# Patient Record
Sex: FEMALE | Race: WHITE | NOT HISPANIC OR LATINO | Employment: FULL TIME | URBAN - METROPOLITAN AREA
[De-identification: names, ages, dates, MRNs, and addresses within clinical notes are randomized per-mention and may not be internally consistent; named-entity substitution may affect disease eponyms.]

---

## 2019-10-30 ENCOUNTER — OFFICE VISIT (OUTPATIENT)
Dept: URGENT CARE | Facility: CLINIC | Age: 59
End: 2019-10-30
Payer: COMMERCIAL

## 2019-10-30 VITALS
HEART RATE: 102 BPM | OXYGEN SATURATION: 98 % | TEMPERATURE: 98.9 F | RESPIRATION RATE: 20 BRPM | DIASTOLIC BLOOD PRESSURE: 80 MMHG | WEIGHT: 135 LBS | SYSTOLIC BLOOD PRESSURE: 135 MMHG

## 2019-10-30 DIAGNOSIS — Z20.818 PERTUSSIS EXPOSURE: Primary | ICD-10-CM

## 2019-10-30 PROCEDURE — 99203 OFFICE O/P NEW LOW 30 MIN: CPT | Performed by: FAMILY MEDICINE

## 2019-10-30 PROCEDURE — 87801 DETECT AGNT MULT DNA AMPLI: CPT | Performed by: FAMILY MEDICINE

## 2019-10-30 RX ORDER — IBUPROFEN 400 MG/1
400 TABLET ORAL EVERY 6 HOURS PRN
COMMUNITY
End: 2022-04-21

## 2019-10-30 NOTE — PROGRESS NOTES
[facility]        NAME: Olga Lawrence is a 64 y o  female  : 1960    MRN: [id number]  DATE: 2019  TIME: 1:56 PM    Assessment and Plan   Pertussis exposure [Z20 818]  1  Pertussis exposure  Bordetella pertussis / parapertussis PCR     Currently asymptomatic  If positive, will contact patient and prescribed azithromycin as this will reduce contagiousness  Patient Instructions     Follow up with PCP in 3-5 days  Proceed to  ER if symptoms worsen  Chief Complaint     Chief Complaint   Patient presents with    Headache     states her father was dx  with pertusis  her daughter is having a baby  now she has a headache  was exposed to pertusis and would like to be checked         History of Present Illness       21-year-old female presents today due to concern for exposure to Bordetella pertussis  Is up-to-date on her vaccinations  Her father was recently diagnosed with whooping cough and she was exposed him for about 45 minutes  Review of Systems   Review of Systems   Constitutional: Negative for chills and fever  HENT: Negative for congestion and rhinorrhea  Respiratory: Negative for cough  Cardiovascular: Negative for chest pain  Gastrointestinal: Negative for diarrhea  Neurological: Positive for headaches  Negative for dizziness           Current Medications       Current Outpatient Medications:     ibuprofen (MOTRIN) 400 mg tablet, Take 400 mg by mouth every 6 (six) hours as needed for mild pain, Disp: , Rfl:     metoprolol tartrate (LOPRESSOR) 25 mg tablet, Take 25 mg by mouth every 12 (twelve) hours, Disp: , Rfl:     Current Allergies     Allergies as of 10/30/2019 - Reviewed 10/30/2019   Allergen Reaction Noted    Penicillins Hives 10/30/2019            The following portions of the patient's history were reviewed and updated as appropriate: allergies, current medications, past family history, past medical history, past social history, past surgical history and problem list      History reviewed  No pertinent past medical history  History reviewed  No pertinent surgical history  History reviewed  No pertinent family history  Medications have been verified  Objective   /80   Pulse 102   Temp 98 9 °F (37 2 °C)   Resp 20   Wt 61 2 kg (135 lb)   SpO2 98%   Breastfeeding? No        Physical Exam     Physical Exam   Constitutional: She is oriented to person, place, and time  She appears well-developed and well-nourished  No distress  HENT:   Head: Normocephalic and atraumatic  Nose: Nose normal    Mouth/Throat: Oropharynx is clear and moist  No oropharyngeal exudate  Eyes: Conjunctivae are normal  Right eye exhibits no discharge  Left eye exhibits no discharge  Neck: No tracheal deviation present  Pulmonary/Chest: Effort normal    Neurological: She is alert and oriented to person, place, and time  No sensory deficit  Skin: Skin is warm  She is not diaphoretic  Psychiatric: She has a normal mood and affect  Her behavior is normal  Judgment and thought content normal    Nursing note and vitals reviewed

## 2019-10-31 LAB
B PARAPERT DNA SPEC QL NAA+PROBE: NOT DETECTED
B PERT DNA SPEC QL NAA+PROBE: NOT DETECTED

## 2021-03-25 ENCOUNTER — OFFICE VISIT (OUTPATIENT)
Dept: FAMILY MEDICINE CLINIC | Facility: CLINIC | Age: 61
End: 2021-03-25
Payer: COMMERCIAL

## 2021-03-25 VITALS
HEIGHT: 62 IN | BODY MASS INDEX: 27.23 KG/M2 | DIASTOLIC BLOOD PRESSURE: 76 MMHG | OXYGEN SATURATION: 97 % | TEMPERATURE: 98 F | SYSTOLIC BLOOD PRESSURE: 110 MMHG | RESPIRATION RATE: 16 BRPM | WEIGHT: 148 LBS | HEART RATE: 76 BPM

## 2021-03-25 DIAGNOSIS — R07.2 PRECORDIAL PAIN: ICD-10-CM

## 2021-03-25 DIAGNOSIS — R00.2 PALPITATIONS: Primary | ICD-10-CM

## 2021-03-25 DIAGNOSIS — K21.00 GASTROESOPHAGEAL REFLUX DISEASE WITH ESOPHAGITIS WITHOUT HEMORRHAGE: ICD-10-CM

## 2021-03-25 PROCEDURE — 1036F TOBACCO NON-USER: CPT | Performed by: INTERNAL MEDICINE

## 2021-03-25 PROCEDURE — 3725F SCREEN DEPRESSION PERFORMED: CPT | Performed by: INTERNAL MEDICINE

## 2021-03-25 PROCEDURE — 3008F BODY MASS INDEX DOCD: CPT | Performed by: INTERNAL MEDICINE

## 2021-03-25 PROCEDURE — 99214 OFFICE O/P EST MOD 30 MIN: CPT | Performed by: INTERNAL MEDICINE

## 2021-03-25 RX ORDER — FAMOTIDINE 20 MG/1
20 TABLET, FILM COATED ORAL 2 TIMES DAILY
Qty: 60 TABLET | Refills: 1 | Status: SHIPPED | OUTPATIENT
Start: 2021-03-25 | End: 2021-09-23 | Stop reason: SDUPTHER

## 2021-03-25 RX ORDER — METOPROLOL SUCCINATE 25 MG/1
50 TABLET, EXTENDED RELEASE ORAL 2 TIMES DAILY
COMMUNITY

## 2021-03-25 NOTE — PROGRESS NOTES
Assessment/Plan:   retrosternal chest pain coming on exertion or relieved by rest it is highly suggestive of coronary pain  She has had stress tests stress echoes which have been normal   My feeling is she may have some anatomical variant from birth which is causing coronary type pain  She would need CTA of heart or cardiac catheterization however she says she is allergic to shellfish since afraid to take dye  She was told to have a steroid prep for that but that is only we will know if she has any coronary anatomical issues   meanwhile advised to get complete blood work including lipid profile  2  She could also have gastroesophageal reflux disease however know why the pain should come on exertion on this she has hiatal hernia then exertion impression hiatal hernia up into her chest   She was therefore advised to have gastroenterology consult possible repeat endoscopy and Tifs operation if necessary  3 history of SVT due to recurrence is ablation     Problem List Items Addressed This Visit     None      Visit Diagnoses     Palpitations    -  Primary    Relevant Orders    CBC and differential    Comprehensive metabolic panel    TSH, 3rd generation with Free T4 reflex    Urinalysis Macroscopic    Precordial pain        Relevant Orders    CBC and differential    Comprehensive metabolic panel    TSH, 3rd generation with Free T4 reflex    Lipid Cascade With Reflex to Lipoprotein Particle Assessment by NMR (With Graph)    Gastroesophageal reflux disease with esophagitis without hemorrhage        Relevant Medications    famotidine (PEPCID) 20 mg tablet    Other Relevant Orders    Ambulatory referral to Gastroenterology            Subjective:      Patient ID: Manjinder Null is a 64 y o  female  Patrick Arreola is here for follow-up  She has history of SVT  Which was refractory  To conventional treatment  She she required to have electric cardioversion as well as ablation     She has been having recurrent  Retrosternal burning pain  She has had upper endoscopy done that showed reflux  This was done several years ago  Now pain is becoming a problem  Interestingly she has pain that comes on  exertion and is relieved by minute of rest   No nausea or shortness of breath or orthopnea or PND no peripheral edema  No nausea vomiting diarrhea  No dysphagia  No food intolerance except for highly acidic foods  Her father had coronary artery disease and angioplasty  There is no other major cardiac history in the family  The following portions of the patient's history were reviewed and updated as appropriate:   Past Medical History:  She has no past medical history on file ,  _______________________________________________________________________  Medical Problems:  does not have a problem list on file ,  _______________________________________________________________________  Past Surgical History:   has no past surgical history on file ,  _______________________________________________________________________  Family History:  family history is not on file ,  _______________________________________________________________________  Social History:   reports that she has quit smoking  Her smoking use included cigarettes  She has never used smokeless tobacco  She reports that she does not drink alcohol or use drugs  ,  _______________________________________________________________________  Allergies:  is allergic to penicillins     _______________________________________________________________________  Current Outpatient Medications   Medication Sig Dispense Refill    metoprolol succinate (TOPROL-XL) 25 mg 24 hr tablet Take 25 mg by mouth 2 (two) times a day      famotidine (PEPCID) 20 mg tablet Take 1 tablet (20 mg total) by mouth 2 (two) times a day 60 tablet 1    ibuprofen (MOTRIN) 400 mg tablet Take 400 mg by mouth every 6 (six) hours as needed for mild pain      metoprolol tartrate (LOPRESSOR) 25 mg tablet Take 25 mg by mouth every 12 (twelve) hours       No current facility-administered medications for this visit       _______________________________________________________________________  Review of Systems   All other systems reviewed and are negative  Objective:  Vitals:    03/25/21 1415   BP: 110/76   BP Location: Left arm   Patient Position: Sitting   Cuff Size: Standard   Pulse: 76   Resp: 16   Temp: 98 °F (36 7 °C)   TempSrc: Tympanic   SpO2: 97%   Weight: 67 1 kg (148 lb)   Height: 5' 2" (1 575 m)     Body mass index is 27 07 kg/m²  Physical Exam  Vitals signs and nursing note reviewed  Constitutional:       Appearance: Normal appearance  She is well-developed and normal weight  HENT:      Head: Normocephalic and atraumatic  Right Ear: External ear normal       Left Ear: External ear normal       Nose: Nose normal    Eyes:      General: No scleral icterus  Extraocular Movements: Extraocular movements intact  Conjunctiva/sclera: Conjunctivae normal       Pupils: Pupils are equal, round, and reactive to light  Neck:      Musculoskeletal: Full passive range of motion without pain, normal range of motion and neck supple  No edema  Thyroid: No thyroid mass or thyromegaly  Vascular: No carotid bruit or JVD  Trachea: Trachea normal    Cardiovascular:      Rate and Rhythm: Normal rate and regular rhythm  Pulses: Normal pulses  Heart sounds: Normal heart sounds  No murmur  Pulmonary:      Effort: Pulmonary effort is normal       Breath sounds: Normal breath sounds  Abdominal:      General: Abdomen is flat  Bowel sounds are normal  There is no distension  Palpations: Abdomen is soft  There is no mass  Hernia: No hernia is present  Musculoskeletal: Normal range of motion  Right lower leg: No edema  Left lower leg: No edema  Lymphadenopathy:      Cervical: No cervical adenopathy  Skin:     General: Skin is warm and dry     Neurological:      Mental Status: She is alert and oriented to person, place, and time  Cranial Nerves: No cranial nerve deficit  Sensory: No sensory deficit  Motor: No abnormal muscle tone  Coordination: Coordination normal       Deep Tendon Reflexes: Reflexes normal    Psychiatric:         Behavior: Behavior normal          Thought Content:  Thought content normal

## 2021-03-26 ENCOUNTER — TRANSCRIBE ORDERS (OUTPATIENT)
Dept: LAB | Facility: CLINIC | Age: 61
End: 2021-03-26

## 2021-03-26 ENCOUNTER — APPOINTMENT (OUTPATIENT)
Dept: LAB | Facility: CLINIC | Age: 61
End: 2021-03-26
Payer: COMMERCIAL

## 2021-03-26 DIAGNOSIS — R00.2 PALPITATIONS: Primary | ICD-10-CM

## 2021-03-26 DIAGNOSIS — R07.2 PRECORDIAL PAIN: ICD-10-CM

## 2021-03-26 LAB
ALBUMIN SERPL BCP-MCNC: 3.9 G/DL (ref 3.5–5)
ALP SERPL-CCNC: 48 U/L (ref 46–116)
ALT SERPL W P-5'-P-CCNC: 39 U/L (ref 12–78)
ANION GAP SERPL CALCULATED.3IONS-SCNC: 9 MMOL/L (ref 4–13)
AST SERPL W P-5'-P-CCNC: 23 U/L (ref 5–45)
BASOPHILS # BLD AUTO: 0.04 THOUSANDS/ΜL (ref 0–0.1)
BASOPHILS NFR BLD AUTO: 1 % (ref 0–1)
BILIRUB SERPL-MCNC: 0.41 MG/DL (ref 0.2–1)
BILIRUB UR QL STRIP: NEGATIVE
BUN SERPL-MCNC: 17 MG/DL (ref 5–25)
CALCIUM SERPL-MCNC: 9.3 MG/DL (ref 8.3–10.1)
CHLORIDE SERPL-SCNC: 105 MMOL/L (ref 100–108)
CLARITY UR: CLEAR
CO2 SERPL-SCNC: 28 MMOL/L (ref 21–32)
COLOR UR: YELLOW
CREAT SERPL-MCNC: 0.79 MG/DL (ref 0.6–1.3)
EOSINOPHIL # BLD AUTO: 0.09 THOUSAND/ΜL (ref 0–0.61)
EOSINOPHIL NFR BLD AUTO: 1 % (ref 0–6)
ERYTHROCYTE [DISTWIDTH] IN BLOOD BY AUTOMATED COUNT: 13 % (ref 11.6–15.1)
GFR SERPL CREATININE-BSD FRML MDRD: 83 ML/MIN/1.73SQ M
GLUCOSE P FAST SERPL-MCNC: 95 MG/DL (ref 65–99)
GLUCOSE UR STRIP-MCNC: NEGATIVE MG/DL
HCT VFR BLD AUTO: 42.4 % (ref 34.8–46.1)
HGB BLD-MCNC: 14.2 G/DL (ref 11.5–15.4)
HGB UR QL STRIP.AUTO: NEGATIVE
IMM GRANULOCYTES # BLD AUTO: 0.02 THOUSAND/UL (ref 0–0.2)
IMM GRANULOCYTES NFR BLD AUTO: 0 % (ref 0–2)
KETONES UR STRIP-MCNC: NEGATIVE MG/DL
LEUKOCYTE ESTERASE UR QL STRIP: NEGATIVE
LYMPHOCYTES # BLD AUTO: 1.92 THOUSANDS/ΜL (ref 0.6–4.47)
LYMPHOCYTES NFR BLD AUTO: 27 % (ref 14–44)
MCH RBC QN AUTO: 30.8 PG (ref 26.8–34.3)
MCHC RBC AUTO-ENTMCNC: 33.5 G/DL (ref 31.4–37.4)
MCV RBC AUTO: 92 FL (ref 82–98)
MONOCYTES # BLD AUTO: 0.44 THOUSAND/ΜL (ref 0.17–1.22)
MONOCYTES NFR BLD AUTO: 6 % (ref 4–12)
NEUTROPHILS # BLD AUTO: 4.61 THOUSANDS/ΜL (ref 1.85–7.62)
NEUTS SEG NFR BLD AUTO: 65 % (ref 43–75)
NITRITE UR QL STRIP: NEGATIVE
NRBC BLD AUTO-RTO: 0 /100 WBCS
PH UR STRIP.AUTO: 5.5 [PH]
PLATELET # BLD AUTO: 238 THOUSANDS/UL (ref 149–390)
PMV BLD AUTO: 11.2 FL (ref 8.9–12.7)
POTASSIUM SERPL-SCNC: 4.2 MMOL/L (ref 3.5–5.3)
PROT SERPL-MCNC: 7.8 G/DL (ref 6.4–8.2)
PROT UR STRIP-MCNC: NEGATIVE MG/DL
RBC # BLD AUTO: 4.61 MILLION/UL (ref 3.81–5.12)
SODIUM SERPL-SCNC: 142 MMOL/L (ref 136–145)
SP GR UR STRIP.AUTO: >=1.03 (ref 1–1.03)
TSH SERPL DL<=0.05 MIU/L-ACNC: 1.65 UIU/ML (ref 0.36–3.74)
UROBILINOGEN UR QL STRIP.AUTO: 0.2 E.U./DL
WBC # BLD AUTO: 7.12 THOUSAND/UL (ref 4.31–10.16)

## 2021-03-26 PROCEDURE — 85025 COMPLETE CBC W/AUTO DIFF WBC: CPT

## 2021-03-26 PROCEDURE — 84443 ASSAY THYROID STIM HORMONE: CPT

## 2021-03-26 PROCEDURE — 80061 LIPID PANEL: CPT

## 2021-03-26 PROCEDURE — 36415 COLL VENOUS BLD VENIPUNCTURE: CPT

## 2021-03-26 PROCEDURE — 80053 COMPREHEN METABOLIC PANEL: CPT

## 2021-03-26 PROCEDURE — 81003 URINALYSIS AUTO W/O SCOPE: CPT

## 2021-03-29 LAB — MISCELLANEOUS LAB TEST RESULT: NORMAL

## 2021-03-30 ENCOUNTER — TELEPHONE (OUTPATIENT)
Dept: FAMILY MEDICINE CLINIC | Facility: CLINIC | Age: 61
End: 2021-03-30

## 2021-03-30 DIAGNOSIS — E78.2 MIXED HYPERLIPIDEMIA: Primary | ICD-10-CM

## 2021-03-31 ENCOUNTER — APPOINTMENT (OUTPATIENT)
Dept: LAB | Facility: HOSPITAL | Age: 61
End: 2021-03-31
Payer: COMMERCIAL

## 2021-03-31 ENCOUNTER — TRANSCRIBE ORDERS (OUTPATIENT)
Dept: LAB | Facility: HOSPITAL | Age: 61
End: 2021-03-31

## 2021-03-31 ENCOUNTER — TELEPHONE (OUTPATIENT)
Dept: FAMILY MEDICINE CLINIC | Facility: CLINIC | Age: 61
End: 2021-03-31

## 2021-03-31 ENCOUNTER — TELEPHONE (OUTPATIENT)
Dept: GASTROENTEROLOGY | Facility: CLINIC | Age: 61
End: 2021-03-31

## 2021-03-31 DIAGNOSIS — E78.2 MIXED HYPERLIPIDEMIA: Primary | ICD-10-CM

## 2021-03-31 DIAGNOSIS — E78.2 MIXED HYPERLIPIDEMIA: ICD-10-CM

## 2021-03-31 LAB
CHOLEST SERPL-MCNC: 247 MG/DL
HDLC SERPL-MCNC: 78 MG/DL
LDLC SERPL CALC-MCNC: 152 MG/DL (ref 0–100)
NONHDLC SERPL-MCNC: 169 MG/DL
TRIGL SERPL-MCNC: 83.4 MG/DL

## 2021-03-31 PROCEDURE — 80061 LIPID PANEL: CPT

## 2021-03-31 PROCEDURE — 36415 COLL VENOUS BLD VENIPUNCTURE: CPT

## 2021-03-31 NOTE — TELEPHONE ENCOUNTER
Patient had left message asking for a call to schedule an appointment with Dr Radha Estrada  I returned call and had to leave message for her to call back  If she returns call, please schedule  Thank you!

## 2021-03-31 NOTE — TELEPHONE ENCOUNTER
Parul Dominguez from [address] called - Dr Domo Plascencia called pt last night and said the lab had not drawn the right panel  Pt said Dr Domo Plascencia said he wanted a Lipid Cascade done but order in the chart says Lipid Panel  Lab diomedes both to make sure  Per Dr Domo Plascencia do Lipid Panel - insurance doesn't cover [address]  Called and informed Parul Dominguez - she will send Lipid Panel only and inform pt

## 2021-04-08 LAB
Lab: NORMAL
Lab: NORMAL
MISCELLANEOUS LAB TEST RESULT: NORMAL

## 2021-05-10 ENCOUNTER — TELEPHONE (OUTPATIENT)
Dept: GASTROENTEROLOGY | Facility: CLINIC | Age: 61
End: 2021-05-10

## 2021-05-10 ENCOUNTER — OFFICE VISIT (OUTPATIENT)
Dept: GASTROENTEROLOGY | Facility: CLINIC | Age: 61
End: 2021-05-10
Payer: COMMERCIAL

## 2021-05-10 VITALS
SYSTOLIC BLOOD PRESSURE: 113 MMHG | HEART RATE: 63 BPM | DIASTOLIC BLOOD PRESSURE: 72 MMHG | BODY MASS INDEX: 25.76 KG/M2 | HEIGHT: 62 IN | WEIGHT: 140 LBS

## 2021-05-10 DIAGNOSIS — Z12.12 ENCOUNTER FOR COLORECTAL CANCER SCREENING: ICD-10-CM

## 2021-05-10 DIAGNOSIS — Z12.11 ENCOUNTER FOR COLORECTAL CANCER SCREENING: ICD-10-CM

## 2021-05-10 DIAGNOSIS — K21.9 GASTROESOPHAGEAL REFLUX DISEASE, UNSPECIFIED WHETHER ESOPHAGITIS PRESENT: Primary | ICD-10-CM

## 2021-05-10 PROCEDURE — 1036F TOBACCO NON-USER: CPT | Performed by: INTERNAL MEDICINE

## 2021-05-10 PROCEDURE — 99204 OFFICE O/P NEW MOD 45 MIN: CPT | Performed by: INTERNAL MEDICINE

## 2021-05-10 PROCEDURE — 3008F BODY MASS INDEX DOCD: CPT | Performed by: INTERNAL MEDICINE

## 2021-05-10 NOTE — PROGRESS NOTES
[facility] - Outpatient Consultation  Jv Chang 63 y o  female MRN: [id number]  Encounter: [id number]          ASSESSMENT AND PLAN:      1  Gastroesophageal reflux disease, unspecified whether esophagitis present   patient with history of GERD symptoms and patient will continue on Pepcid prior to exercising I explained to the patient that likely this is related to exercise and she recently had a stress echo which was negative for any findings  - EGD; Future    2  Encounter for colorectal cancer screening   patient is refusing colonoscopy due to prep and electrolyte abnormality and therefore we will order Cologuard testing and if this is positive, then she will need colonoscopy and this was explained to the patient       The patient was seen and examined with      ______________________________________________________________________    HPI:      This is a 82-year-old female who presents today for evaluation of reflux symptoms while exercising  Patient states this has been going on for 2 years but recently she was not exercising as much because she did have some herniated discs and then she recently started exercising again and symptoms have returned  She states that she had an EGD and colonoscopy about 25 years ago and at that time colonoscopy was reportedly normal and she does report that her maternal grandfather had colon cancer but she does not want colonoscopy due to prep causing electrolyte abnormality and causing arrhthymias  Patient states that the Pepcid has been helping but she states that if she works out on an empty stomach and she does not have the symptoms  She otherwise states that she has no difficulty swallowing or significant weight loss  REVIEW OF SYSTEMS:    CONSTITUTIONAL: Denies any fever, chills, rigors, and weight loss  HEENT: No earache or tinnitus  Denies hearing loss or visual disturbances  CARDIOVASCULAR: No chest pain or palpitations  RESPIRATORY: Denies any cough, hemoptysis, shortness of breath or dyspnea on exertion  GASTROINTESTINAL: As noted in the History of Present Illness  GENITOURINARY: No problems with urination  Denies any hematuria or dysuria  NEUROLOGIC: No dizziness or vertigo, denies headaches  MUSCULOSKELETAL: Denies any muscle or joint pain  SKIN: Denies skin rashes or itching  ENDOCRINE: Denies excessive thirst  Denies intolerance to heat or cold  PSYCHOSOCIAL: Denies depression or anxiety  Denies any recent memory loss  Historical Information   Past Medical History:   Diagnosis Date    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[facility]accelerated idioventricular rhythm) ([facility]     PVC (premature ventricular contraction)     SVT (supraventricular tachycardia) ([facility])      Past Surgical History:   Procedure Laterality Date    COLONOSCOPY      UPPER GASTROINTESTINAL ENDOSCOPY       Social History   Social History     Substance and Sexual Activity   Alcohol Use Never    Frequency: Never     Social History     Substance and Sexual Activity   Drug Use Never     Social History     Tobacco Use   Smoking Status Former Smoker    Types: Cigarettes   Smokeless Tobacco Never Used     Family History   Problem Relation Age of Onset    No Known Problems Mother     No Known Problems Father        Meds/Allergies       Current Outpatient Medications:     Cranberry 1000 MG CAPS    famotidine (PEPCID) 20 mg tablet    Magnesium 100 MG CAPS    metoprolol succinate (TOPROL-XL) 25 mg 24 hr tablet    ibuprofen (MOTRIN) 400 mg tablet    metoprolol tartrate (LOPRESSOR) 25 mg tablet    Allergies   Allergen Reactions    Penicillins Hives    Shellfish-Derived Products - Food Allergy Swelling           Objective     Blood pressure 113/72, pulse 63, height 5' 2" (1 575 m), weight 63 5 kg (140 lb), not currently breastfeeding  Body mass index is 25 61 kg/m²          PHYSICAL EXAM:      General Appearance:   Alert, cooperative, no distress   HEENT:   Normocephalic, atraumatic, anicteric      Neck:  Supple, symmetrical, trachea midline   Lungs:   Clear to auscultation bilaterally; no rales, rhonchi or wheezing; respirations unlabored    Heart[de-identified]   Regular rate and rhythm; no murmur, rub, or gallop  Abdomen:   Soft, non-tender, non-distended; normal bowel sounds; no masses, no organomegaly    Genitalia:   Deferred    Rectal:   Deferred    Extremities:  No cyanosis, clubbing or edema    Pulses:  2+ and symmetric    Skin:  No jaundice, rashes, or lesions    Lymph nodes:  No palpable cervical lymphadenopathy        Lab Results:   No visits with results within 1 Day(s) from this visit  Latest known visit with results is:   Appointment on 03/31/2021   Component Date Value    Cholesterol 03/31/2021 247*    Triglycerides 03/31/2021 83 4     HDL, Direct 03/31/2021 78     LDL Calculated 03/31/2021 152*    Non-HDL-Chol (CHOL-HDL) 03/31/2021 169     Miscellaneous Lab Test R* 03/31/2021 [facility] [place]     REFERRAL TEST NAME 03/31/2021 [facility] [place]     REFERRAL LAB 03/31/2021 [facility]          Radiology Results:   No results found

## 2021-05-11 NOTE — TELEPHONE ENCOUNTER
Called and spoke with patient  I informed her that I spoke with manager, Sánchez Pierre  We can't schedule at [facility] due to not having block time there  Informed of the 3 places I can schedule  She will think about it and call me back  Will f/u with her in a week if she doesn't return call to schedule

## 2021-05-18 NOTE — TELEPHONE ENCOUNTER
Left message for patient to call regarding if she thought about as to where she would like to schedule her EGD, either at [facility] or [facility]  Will call patient one more time in 2 weeks if she doesn't return call to schedule

## 2021-05-27 NOTE — TELEPHONE ENCOUNTER
Since I spoke with patient regarding this matter  Will wait for patient to call if she wants to schedule

## 2021-09-22 ENCOUNTER — TELEPHONE (OUTPATIENT)
Dept: FAMILY MEDICINE CLINIC | Facility: CLINIC | Age: 61
End: 2021-09-22

## 2021-09-22 NOTE — TELEPHONE ENCOUNTER
Patient had called to make appt with Dr Lawrence Mireles however when I tried to call back there was no answer, left message

## 2021-09-23 ENCOUNTER — OFFICE VISIT (OUTPATIENT)
Dept: FAMILY MEDICINE CLINIC | Facility: CLINIC | Age: 61
End: 2021-09-23
Payer: COMMERCIAL

## 2021-09-23 ENCOUNTER — APPOINTMENT (OUTPATIENT)
Dept: LAB | Facility: CLINIC | Age: 61
End: 2021-09-23
Payer: COMMERCIAL

## 2021-09-23 VITALS
HEIGHT: 62 IN | RESPIRATION RATE: 14 BRPM | BODY MASS INDEX: 25.95 KG/M2 | TEMPERATURE: 97.7 F | HEART RATE: 72 BPM | WEIGHT: 141 LBS | DIASTOLIC BLOOD PRESSURE: 70 MMHG | OXYGEN SATURATION: 98 % | SYSTOLIC BLOOD PRESSURE: 110 MMHG

## 2021-09-23 DIAGNOSIS — R00.0 TACHYARRHYTHMIA: Primary | ICD-10-CM

## 2021-09-23 DIAGNOSIS — K21.9 GASTROESOPHAGEAL REFLUX DISEASE WITHOUT ESOPHAGITIS: ICD-10-CM

## 2021-09-23 DIAGNOSIS — R00.0 TACHYARRHYTHMIA: ICD-10-CM

## 2021-09-23 DIAGNOSIS — I44.2 AIVR (ACCELERATED IDIOVENTRICULAR RHYTHM) (HCC): ICD-10-CM

## 2021-09-23 DIAGNOSIS — K21.00 GASTROESOPHAGEAL REFLUX DISEASE WITH ESOPHAGITIS WITHOUT HEMORRHAGE: ICD-10-CM

## 2021-09-23 LAB
ALBUMIN SERPL BCP-MCNC: 4.1 G/DL (ref 3.5–5)
ALP SERPL-CCNC: 51 U/L (ref 46–116)
ALT SERPL W P-5'-P-CCNC: 19 U/L (ref 12–78)
ANION GAP SERPL CALCULATED.3IONS-SCNC: 6 MMOL/L (ref 4–13)
AST SERPL W P-5'-P-CCNC: 14 U/L (ref 5–45)
BASOPHILS # BLD AUTO: 0.03 THOUSANDS/ΜL (ref 0–0.1)
BASOPHILS NFR BLD AUTO: 0 % (ref 0–1)
BILIRUB SERPL-MCNC: 0.36 MG/DL (ref 0.2–1)
BUN SERPL-MCNC: 11 MG/DL (ref 5–25)
CALCIUM SERPL-MCNC: 9.2 MG/DL (ref 8.3–10.1)
CHLORIDE SERPL-SCNC: 104 MMOL/L (ref 100–108)
CO2 SERPL-SCNC: 31 MMOL/L (ref 21–32)
CREAT SERPL-MCNC: 0.78 MG/DL (ref 0.6–1.3)
CRP SERPL QL: <3 MG/L
EOSINOPHIL # BLD AUTO: 0.07 THOUSAND/ΜL (ref 0–0.61)
EOSINOPHIL NFR BLD AUTO: 1 % (ref 0–6)
ERYTHROCYTE [DISTWIDTH] IN BLOOD BY AUTOMATED COUNT: 13.3 % (ref 11.6–15.1)
ERYTHROCYTE [SEDIMENTATION RATE] IN BLOOD: 13 MM/HOUR (ref 0–29)
GFR SERPL CREATININE-BSD FRML MDRD: 83 ML/MIN/1.73SQ M
GLUCOSE SERPL-MCNC: 84 MG/DL (ref 65–140)
HCT VFR BLD AUTO: 43.5 % (ref 34.8–46.1)
HGB BLD-MCNC: 13.8 G/DL (ref 11.5–15.4)
IMM GRANULOCYTES # BLD AUTO: 0.02 THOUSAND/UL (ref 0–0.2)
IMM GRANULOCYTES NFR BLD AUTO: 0 % (ref 0–2)
LYMPHOCYTES # BLD AUTO: 2.38 THOUSANDS/ΜL (ref 0.6–4.47)
LYMPHOCYTES NFR BLD AUTO: 29 % (ref 14–44)
MCH RBC QN AUTO: 29.6 PG (ref 26.8–34.3)
MCHC RBC AUTO-ENTMCNC: 31.7 G/DL (ref 31.4–37.4)
MCV RBC AUTO: 93 FL (ref 82–98)
MONOCYTES # BLD AUTO: 0.45 THOUSAND/ΜL (ref 0.17–1.22)
MONOCYTES NFR BLD AUTO: 5 % (ref 4–12)
NEUTROPHILS # BLD AUTO: 5.36 THOUSANDS/ΜL (ref 1.85–7.62)
NEUTS SEG NFR BLD AUTO: 65 % (ref 43–75)
NRBC BLD AUTO-RTO: 0 /100 WBCS
PLATELET # BLD AUTO: 262 THOUSANDS/UL (ref 149–390)
PMV BLD AUTO: 11.1 FL (ref 8.9–12.7)
POTASSIUM SERPL-SCNC: 4.1 MMOL/L (ref 3.5–5.3)
PROT SERPL-MCNC: 8.1 G/DL (ref 6.4–8.2)
RBC # BLD AUTO: 4.67 MILLION/UL (ref 3.81–5.12)
SODIUM SERPL-SCNC: 141 MMOL/L (ref 136–145)
TSH SERPL DL<=0.05 MIU/L-ACNC: 2.28 UIU/ML (ref 0.36–3.74)
WBC # BLD AUTO: 8.31 THOUSAND/UL (ref 4.31–10.16)

## 2021-09-23 PROCEDURE — 86140 C-REACTIVE PROTEIN: CPT

## 2021-09-23 PROCEDURE — 84443 ASSAY THYROID STIM HORMONE: CPT

## 2021-09-23 PROCEDURE — 85652 RBC SED RATE AUTOMATED: CPT

## 2021-09-23 PROCEDURE — 36415 COLL VENOUS BLD VENIPUNCTURE: CPT

## 2021-09-23 PROCEDURE — 85025 COMPLETE CBC W/AUTO DIFF WBC: CPT

## 2021-09-23 PROCEDURE — 80053 COMPREHEN METABOLIC PANEL: CPT

## 2021-09-23 PROCEDURE — 1036F TOBACCO NON-USER: CPT | Performed by: INTERNAL MEDICINE

## 2021-09-23 PROCEDURE — 99214 OFFICE O/P EST MOD 30 MIN: CPT | Performed by: INTERNAL MEDICINE

## 2021-09-23 RX ORDER — FAMOTIDINE 20 MG/1
20 TABLET, FILM COATED ORAL 2 TIMES DAILY
Qty: 180 TABLET | Refills: 1 | Status: SHIPPED | OUTPATIENT
Start: 2021-09-23 | End: 2022-02-22

## 2021-09-29 NOTE — PROGRESS NOTES
Assessment/Plan:  1  Palpitations, premature beats associated shortness of breath is concerning may be recurrence of her SVT  Or she may have some inflammation like pericarditis or myocarditis  We going to get an echocardiogram CBC, sed rate, CRP  If all comes normal she may need a Holter monitor and cardiology follow-up  2  History of SVT status post ablation  3  Gastroesophageal reflux disease  4  History of accelerated idioventricular rhythm   meds and labs reviewed advised to continue metoprolol pending results of the test         Problem List Items Addressed This Visit        Cardiovascular and Mediastinum    AIVR (accelerated idioventricular rhythm) ([facility] [address] )      Other Visit Diagnoses     Tachyarrhythmia    -  Primary    Relevant Orders    CBC and differential (Completed)    Sedimentation rate, automated (Completed)    C-reactive protein (Completed)    Comprehensive metabolic panel (Completed)    TSH, 3rd generation with Free T4 reflex (Completed)    Echo complete    Gastroesophageal reflux disease without esophagitis        Relevant Medications    famotidine (PEPCID) 20 mg tablet    Gastroesophageal reflux disease with esophagitis without hemorrhage        Relevant Medications    famotidine (PEPCID) 20 mg tablet            Subjective:      Patient ID: Cody Lopez is a 63 y o  female  Aydinchristina Rodriguezer is here because of palpitations and shortness of breath  She has history of SVT that required multiple cardioversions  eventually ablation,  Since ablations she has not had any SVTs however she is having now some skipped beats head palpitations associated with shortness of breath  She is concerned  She has some retrosternal burning but she has history of reflux  No chest pain on exertion orthopnea or PND  No lower extremity edema    Appetite is good weight is stable      The following portions of the patient's history were reviewed and updated as appropriate:   Past Medical History:  She has a past medical history of AIVR (accelerated idioventricular rhythm) ([facility] [address] ), PVC (premature ventricular contraction), and SVT (supraventricular tachycardia) ([facility] [address] )  ,  _______________________________________________________________________  Medical Problems:  does not have any pertinent problems on file ,  _______________________________________________________________________  Past Surgical History:   has a past surgical history that includes Colonoscopy and Upper gastrointestinal endoscopy  ,  _______________________________________________________________________  Family History:  family history includes No Known Problems in her father and mother ,  _______________________________________________________________________  Social History:   reports that she has quit smoking  Her smoking use included cigarettes  She has never used smokeless tobacco  She reports that she does not drink alcohol and does not use drugs  ,  _______________________________________________________________________  Allergies:  is allergic to penicillins and shellfish-derived products - food allergy     _______________________________________________________________________  Current Outpatient Medications   Medication Sig Dispense Refill    Cranberry 1000 MG CAPS Take 1 tablet by mouth daily      famotidine (PEPCID) 20 mg tablet Take 1 tablet (20 mg total) by mouth 2 (two) times a day 180 tablet 1    ibuprofen (MOTRIN) 400 mg tablet Take 400 mg by mouth every 6 (six) hours as needed for mild pain      Magnesium 100 MG CAPS Take 100 mg by mouth 2 (two) times a day      metoprolol succinate (TOPROL-XL) 25 mg 24 hr tablet Take 100 mg by mouth daily Taking 100mg daily      metoprolol tartrate (LOPRESSOR) 25 mg tablet Take 25 mg by mouth every 12 (twelve) hours       No current facility-administered medications for this visit      _______________________________________________________________________  Review of Systems   All other systems reviewed and are negative  Objective:  Vitals:    09/23/21 1330   BP: 110/70   BP Location: Left arm   Patient Position: Sitting   Cuff Size: Standard   Pulse: 72   Resp: 14   Temp: 97 7 °F (36 5 °C)   TempSrc: Temporal   SpO2: 98%   Weight: 64 kg (141 lb)   Height: 5' 2" (1 575 m)     Body mass index is 25 79 kg/m²  Physical Exam  Vitals and nursing note reviewed  Constitutional:       Appearance: Normal appearance  She is well-developed and normal weight  HENT:      Head: Normocephalic  Right Ear: External ear normal       Left Ear: External ear normal       Nose: Nose normal       Mouth/Throat:      Mouth: Mucous membranes are moist    Eyes:      General: No scleral icterus  Extraocular Movements: Extraocular movements intact  Conjunctiva/sclera: Conjunctivae normal       Pupils: Pupils are equal, round, and reactive to light  Neck:      Thyroid: No thyroid mass or thyromegaly  Vascular: No carotid bruit or JVD  Trachea: Trachea normal    Cardiovascular:      Rate and Rhythm: Normal rate and regular rhythm  Pulses: Normal pulses  Heart sounds: Normal heart sounds  No murmur heard  Pulmonary:      Effort: Pulmonary effort is normal       Breath sounds: Normal breath sounds  Abdominal:      General: Bowel sounds are normal  There is no distension  Palpations: Abdomen is soft  There is no mass  Tenderness: There is no abdominal tenderness  Hernia: No hernia is present  Musculoskeletal:         General: Normal range of motion  Cervical back: Full passive range of motion without pain, normal range of motion and neck supple  No edema  Right lower leg: No edema  Left lower leg: No edema  Lymphadenopathy:      Cervical: No cervical adenopathy  Skin:     General: Skin is warm and dry  Neurological:      General: No focal deficit present  Mental Status: She is alert and oriented to person, place, and time        Cranial Nerves: No cranial nerve deficit  Sensory: No sensory deficit  Motor: No abnormal muscle tone  Coordination: Coordination normal       Deep Tendon Reflexes: Reflexes normal    Psychiatric:         Mood and Affect: Mood normal          Behavior: Behavior normal          Thought Content:  Thought content normal

## 2021-09-30 ENCOUNTER — OFFICE VISIT (OUTPATIENT)
Dept: FAMILY MEDICINE CLINIC | Facility: CLINIC | Age: 61
End: 2021-09-30
Payer: COMMERCIAL

## 2021-09-30 VITALS
WEIGHT: 142 LBS | BODY MASS INDEX: 26.13 KG/M2 | HEIGHT: 62 IN | TEMPERATURE: 98 F | HEART RATE: 70 BPM | OXYGEN SATURATION: 97 % | DIASTOLIC BLOOD PRESSURE: 64 MMHG | RESPIRATION RATE: 16 BRPM | SYSTOLIC BLOOD PRESSURE: 108 MMHG

## 2021-09-30 DIAGNOSIS — I47.1 SUPRAVENTRICULAR TACHYCARDIA (HCC): Primary | ICD-10-CM

## 2021-09-30 PROCEDURE — 99213 OFFICE O/P EST LOW 20 MIN: CPT | Performed by: INTERNAL MEDICINE

## 2021-09-30 PROCEDURE — 3008F BODY MASS INDEX DOCD: CPT | Performed by: INTERNAL MEDICINE

## 2021-09-30 NOTE — PROGRESS NOTES
Assessment/Plan:  1  Persistent cardiac arrhythmia most likely supraventricular however we not feel were diseases as not been documented I highly recommended that she get a Holter monitor done  Meanwhile I agree with increasing her metoprolol dose but to take 25 mg 3 him times a day and check  Her blood pressure before taking metoprolol to make sure it is not too low  Also check her heart rate  She is in her she can do   she was reassured again there is nothing see this is wrong with her heart  Echocardiogram was normal and blood work was also normal         Problem List Items Addressed This Visit     None            Subjective:      Patient ID: Bg Gaffney is a 63 y o  female  Yosephia Moni is here for follow-up  She is still having cardiac arrhythmia most likely supraventricular as she has history of the same is status post ablation  Is upsetting her weight much she things that is seriously something wrong with her heart  She was reassured his echocardiogram is normal there is no myocarditis or cardiomyopathy or pericarditis  CRP and sed rate was also normal suggesting there is no inflammation of cardiac muscle  However she should have a Holter monitor done to see what occurred however Jana as she has as she has history of accelerated idioventricular rhythm as well as supraventricular arrhythmia  For supraventricular may she has had an ablation  She has no dizziness or lightheadedness, no chest pains or shortness of breath, no orthopnea or PND, no peripheral edema      The following portions of the patient's history were reviewed and updated as appropriate:   Past Medical History:  She has a past medical history of [facility] ), PVC (premature ventricular contraction), and SVT (supraventricular tachycardia) ([facility] )  ,  _______________________________________________________________________  Medical Problems:  does not have any pertinent problems on file ,  _______________________________________________________________________  Past Surgical History:   has a past surgical history that includes Colonoscopy and Upper gastrointestinal endoscopy  ,  _______________________________________________________________________  Family History:  family history includes No Known Problems in her father and mother ,  _______________________________________________________________________  Social History:   reports that she has quit smoking  Her smoking use included cigarettes  She has never used smokeless tobacco  She reports that she does not drink alcohol and does not use drugs  ,  _______________________________________________________________________  Allergies:  is allergic to penicillins and shellfish-derived products - food allergy     _______________________________________________________________________  Current Outpatient Medications   Medication Sig Dispense Refill    Cranberry 1000 MG CAPS Take 1 tablet by mouth daily      famotidine (PEPCID) 20 mg tablet Take 1 tablet (20 mg total) by mouth 2 (two) times a day 180 tablet 1    ibuprofen (MOTRIN) 400 mg tablet Take 400 mg by mouth every 6 (six) hours as needed for mild pain      Magnesium 100 MG CAPS Take 100 mg by mouth 2 (two) times a day      metoprolol succinate (TOPROL-XL) 25 mg 24 hr tablet Take 100 mg by mouth daily Taking 100mg daily      metoprolol tartrate (LOPRESSOR) 25 mg tablet Take 25 mg by mouth every 12 (twelve) hours       No current facility-administered medications for this visit      _______________________________________________________________________  Review of Systems   All other systems reviewed and are negative          Objective:  Vitals:    09/30/21 1417   BP: 108/64   BP Location: Left arm   Patient Position: Sitting   Cuff Size: Standard   Pulse: 70   Resp: 16   Temp: 98 °F (36 7 °C)   TempSrc: Temporal   SpO2: 97%   Weight: 64 4 kg (142 lb)   Height: 5' 2" (1 575 m)     Body mass index is 25 97 kg/m²  Physical Exam  Constitutional:       Appearance: She is well-developed  HENT:      Head: Normocephalic  Right Ear: External ear normal       Left Ear: External ear normal       Nose: Nose normal    Eyes:      General: No scleral icterus  Conjunctiva/sclera: Conjunctivae normal       Pupils: Pupils are equal, round, and reactive to light  Neck:      Thyroid: No thyroid mass or thyromegaly  Vascular: No carotid bruit or JVD  Trachea: Trachea normal    Cardiovascular:      Rate and Rhythm: Normal rate and regular rhythm  Heart sounds: Normal heart sounds  No murmur heard  Pulmonary:      Effort: Pulmonary effort is normal       Breath sounds: Normal breath sounds  Abdominal:      General: Bowel sounds are normal  There is no distension  Palpations: Abdomen is soft  There is no mass  Hernia: No hernia is present  Musculoskeletal:         General: Normal range of motion  Cervical back: Full passive range of motion without pain and normal range of motion  No edema  Skin:     General: Skin is warm  Neurological:      Mental Status: She is alert and oriented to person, place, and time  Cranial Nerves: No cranial nerve deficit  Sensory: No sensory deficit  Motor: No abnormal muscle tone  Coordination: Coordination normal       Deep Tendon Reflexes: Reflexes normal    Psychiatric:         Behavior: Behavior normal          Thought Content:  Thought content normal

## 2021-11-23 DIAGNOSIS — Z11.52 ENCOUNTER FOR SCREENING FOR COVID-19: Primary | ICD-10-CM

## 2021-11-23 PROCEDURE — U0003 INFECTIOUS AGENT DETECTION BY NUCLEIC ACID (DNA OR RNA); SEVERE ACUTE RESPIRATORY SYNDROME CORONAVIRUS 2 (SARS-COV-2) (CORONAVIRUS DISEASE [COVID-19]), AMPLIFIED PROBE TECHNIQUE, MAKING USE OF HIGH THROUGHPUT TECHNOLOGIES AS DESCRIBED BY CMS-2020-01-R: HCPCS | Performed by: INTERNAL MEDICINE

## 2021-11-23 PROCEDURE — U0005 INFEC AGEN DETEC AMPLI PROBE: HCPCS | Performed by: INTERNAL MEDICINE

## 2022-02-03 ENCOUNTER — OFFICE VISIT (OUTPATIENT)
Dept: FAMILY MEDICINE CLINIC | Facility: CLINIC | Age: 62
End: 2022-02-03
Payer: COMMERCIAL

## 2022-02-03 VITALS
DIASTOLIC BLOOD PRESSURE: 80 MMHG | RESPIRATION RATE: 16 BRPM | OXYGEN SATURATION: 97 % | TEMPERATURE: 97.6 F | HEIGHT: 62 IN | HEART RATE: 64 BPM | SYSTOLIC BLOOD PRESSURE: 130 MMHG | BODY MASS INDEX: 26.35 KG/M2 | WEIGHT: 143.2 LBS

## 2022-02-03 DIAGNOSIS — R07.89 CHEST WALL PAIN: Primary | ICD-10-CM

## 2022-02-03 PROCEDURE — 99213 OFFICE O/P EST LOW 20 MIN: CPT | Performed by: INTERNAL MEDICINE

## 2022-02-03 NOTE — PROGRESS NOTES
Assessment/Plan:    No problem-specific Assessment & Plan notes found for this encounter  {Assess/PlanSmartLinks:82547}      Subjective:      Patient ID: Manjinder Negron is a 63 y o  female with past medical history of SVT s/p ablation here for follow up    HPI    {Common ambulatory SmartLinks:85004}    Review of Systems      Objective:      /80 (BP Location: Left arm, Patient Position: Sitting, Cuff Size: Standard)   Pulse 64   Temp 97 6 °F (36 4 °C) (Temporal)   Resp 16   Ht 5' 2" (1 575 m)   Wt 65 kg (143 lb 3 2 oz)   SpO2 97%   BMI 26 19 kg/m²          Physical Exam

## 2022-02-05 NOTE — PROGRESS NOTES
Assessment/Plan:  Musculoskeletal pain involving upper back  She was reassured it is nothing serious, should resolve slowly  She says pain is getting somewhat better than it was in the beginning  Not sure if there is any connection to COVID  History of SVT no recent recurrence         Problem List Items Addressed This Visit     None            Subjective:      Patient ID: Jacques Mooney is a 63 y o  female  Wendi Jenkins is here because of upper back pain that radiates around to the chest   Pain is sharp  Not pleuritic not associated with shortness of Breath  However pain is increased by body movement raising arm are turning around  Pain started shortly after she got COVID immunization  She has no rash, no fever, no chills  No headaches or dizziness, no generalized muscle aches bone aches headaches dizziness, no earache, no sore throat or difficulty swallowing, no swollen glands, no loss of smell or taste, no GI or  issues particularly no abdominal pain no heartburn no nausea vomiting diarrhea no dysuria or frequency      The following portions of the patient's history were reviewed and updated as appropriate:   Past Medical History:  She has a past medical history of AIVR (accelerated idioventricular rhythm) ([facility] ), PVC (premature ventricular contraction), and SVT (supraventricular tachycardia) ([facility] )  ,  _______________________________________________________________________  Medical Problems:  does not have any pertinent problems on file ,  _______________________________________________________________________  Past Surgical History:   has a past surgical history that includes Colonoscopy and Upper gastrointestinal endoscopy  ,  _______________________________________________________________________  Family History:  family history includes No Known Problems in her father and mother ,  _______________________________________________________________________  Social History:   reports that she has quit smoking  Her smoking use included cigarettes  She has never used smokeless tobacco  She reports that she does not drink alcohol and does not use drugs  ,  _______________________________________________________________________  Allergies:  is allergic to penicillins and shellfish-derived products - food allergy     _______________________________________________________________________  Current Outpatient Medications   Medication Sig Dispense Refill    Cranberry 1000 MG CAPS Take 1 tablet by mouth daily      famotidine (PEPCID) 20 mg tablet Take 1 tablet (20 mg total) by mouth 2 (two) times a day 180 tablet 1    Magnesium 100 MG CAPS Take 100 mg by mouth 2 (two) times a day      metoprolol succinate (TOPROL-XL) 25 mg 24 hr tablet Take 50 mg by mouth 2 (two) times a day Taking 100mg daily       ibuprofen (MOTRIN) 400 mg tablet Take 400 mg by mouth every 6 (six) hours as needed for mild pain (Patient not taking: Reported on 2/3/2022 )      metoprolol tartrate (LOPRESSOR) 25 mg tablet Take 25 mg by mouth every 12 (twelve) hours (Patient not taking: Reported on 2/3/2022 )       No current facility-administered medications for this visit      _______________________________________________________________________  Review of Systems   All other systems reviewed and are negative  Objective:  Vitals:    02/03/22 0957   BP: 130/80   BP Location: Left arm   Patient Position: Sitting   Cuff Size: Standard   Pulse: 64   Resp: 16   Temp: 97 6 °F (36 4 °C)   TempSrc: Temporal   SpO2: 97%   Weight: 65 kg (143 lb 3 2 oz)   Height: 5' 2" (1 575 m)     Body mass index is 26 19 kg/m²  Physical Exam  Constitutional:       Appearance: Normal appearance  She is well-developed  HENT:      Head: Normocephalic  Right Ear: External ear normal       Left Ear: External ear normal       Nose: Nose normal       Mouth/Throat:      Mouth: Mucous membranes are moist    Eyes:      General: No scleral icterus       Extraocular Movements: Extraocular movements intact  Conjunctiva/sclera: Conjunctivae normal       Pupils: Pupils are equal, round, and reactive to light  Neck:      Thyroid: No thyroid mass or thyromegaly  Vascular: No carotid bruit or JVD  Trachea: Trachea normal    Cardiovascular:      Rate and Rhythm: Normal rate and regular rhythm  Heart sounds: Normal heart sounds  No murmur heard  Pulmonary:      Effort: Pulmonary effort is normal       Breath sounds: Normal breath sounds  Abdominal:      General: Bowel sounds are normal  There is no distension  Palpations: Abdomen is soft  There is no mass  Hernia: No hernia is present  Musculoskeletal:         General: Normal range of motion  Cervical back: Full passive range of motion without pain, normal range of motion and neck supple  No edema  Comments: Pain upper back mid scapular region, has tenderness paraspinous region on the left  No rash to suggest herpes zoster  Lungs are clear   Lymphadenopathy:      Cervical: No cervical adenopathy  Skin:     General: Skin is warm  Neurological:      General: No focal deficit present  Mental Status: She is alert and oriented to person, place, and time  Cranial Nerves: No cranial nerve deficit  Sensory: No sensory deficit  Motor: No abnormal muscle tone  Coordination: Coordination normal       Deep Tendon Reflexes: Reflexes normal    Psychiatric:         Behavior: Behavior normal          Thought Content:  Thought content normal

## 2022-02-22 ENCOUNTER — OFFICE VISIT (OUTPATIENT)
Dept: DERMATOLOGY | Facility: CLINIC | Age: 62
End: 2022-02-22
Payer: COMMERCIAL

## 2022-02-22 VITALS — HEIGHT: 62 IN | BODY MASS INDEX: 26.13 KG/M2 | TEMPERATURE: 97.9 F | WEIGHT: 142 LBS

## 2022-02-22 DIAGNOSIS — L82.1 SEBORRHEIC KERATOSIS: Primary | ICD-10-CM

## 2022-02-22 PROCEDURE — 99203 OFFICE O/P NEW LOW 30 MIN: CPT | Performed by: DERMATOLOGY

## 2022-02-22 PROCEDURE — 3008F BODY MASS INDEX DOCD: CPT | Performed by: DERMATOLOGY

## 2022-02-22 PROCEDURE — 1036F TOBACCO NON-USER: CPT | Performed by: DERMATOLOGY

## 2022-02-22 NOTE — PATIENT INSTRUCTIONS
1  SEBORRHEIC KERATOSIS; NON-INFLAMED      Assessment and Plan:  Based on a thorough discussion of this condition and the management approach to it (including a comprehensive discussion of the known risks, side effects and potential benefits of treatment), the patient (family) agrees to implement the following specific plan:   Reassured benign    Continue to monitor for any changes    Continue annual skin checks     Seborrheic Keratosis  A seborrheic keratosis is a harmless warty spot that appears during adult life as a common sign of skin aging  Seborrheic keratoses can arise on any area of skin, covered or uncovered, with the usual exception of the palms and soles  They do not arise from mucous membranes  Seborrheic keratoses can have highly variable appearance  Seborrheic keratoses are extremely common  It has been estimated that over 90% of adults over the age of 61 years have one or more of them  They occur in males and females of all races, typically beginning to erupt in the 35s or 45s  They are uncommon under the age of 21 years  The precise cause of seborrhoeic keratoses is not known  Seborrhoeic keratoses are considered degenerative in nature  As time goes by, seborrheic keratoses tend to become more numerous  Some people inherit a tendency to develop a very large number of them; some people may have hundreds of them  The name "seborrheic keratosis" is misleading, because these lesions are not limited to a seborrhoeic distribution (scalp, mid-face, chest, upper back), nor are they formed from sebaceous glands, nor are they associated with sebum -- which is greasy    Seborrheic keratosis may also be called "SK," "Seb K," "basal cell papilloma," "senile wart," or "barnacle "      Researchers have noted:   Eruptive seborrhoeic keratoses can follow sunburn or dermatitis   Skin friction may be the reason they appear in body folds   Viral cause (e g , human papillomavirus) seems unlikely   Stable and clonal mutations or activation of FRFR3, PIK3CA, EPHRAIM, AKT1 and EGFR genes are found in seborrhoeic keratoses   Seborrhoeic keratosis can arise from solar lentigo   FRFR3 mutations also arise in solar lentigines  These mutations are associated with increased age and location on the head and neck, suggesting a role of ultraviolet radiation in these lesions   Seborrheic keratoses do not harbour tumour suppressor gene mutations   Epidermal growth factor receptor inhibitors, which are used to treat some cancers, often result in an increase in verrucal (warty) keratoses  There is no easy way to remove multiple lesions on a single occasion  Unless a specific lesion is "inflamed" and is causing pain or stinging/burning or is bleeding, most insurance companies do not authorize treatment

## 2022-02-22 NOTE — PROGRESS NOTES
[facility] Note     Patient Name: Ana Wolfe  Encounter Date: 02/22/2022     Have you been cared for by a [facility] Dermatologist in the last 3 years and, if so, which one? No    · Have you traveled outside of the [address] [place] [address] in the past 3 months or outside of the [facility] area in the last 2 weeks? No     May we call your Preferred Phone number to discuss your specific medical information? Yes     May we leave a detailed message that includes your specific medical information? Yes      Today's Chief Concerns:   Concern #1:  Spot of concern on back       Past Medical History:  Have you personally ever had or currently have any of the following? · Skin cancer (such as Melanoma, Basal Cell Carcinoma, Squamous Cell Carcinoma? (If Yes, please provide more detail)- No  · Eczema: No  · Psoriasis: No  · HIV/AIDS: No  · Hepatitis B or C: No  · Tuberculosis: No  · Systemic Immunosuppression such as Diabetes, Biologic or Immunotherapy, Chemotherapy, Organ Transplantation, Bone Marrow Transplantation (If YES, please provide more detail): No  · Radiation Treatment (If YES, please provide more detail): No  · Any other major medical conditions/concerns? 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 Photography     Family History:  Have any of your "first degree relatives" (parent, brother, sister, or child) had any of the following       · Skin cancer such as Melanoma or Merkel Cell Carcinoma or Pancreatic Cancer? No  · Eczema, Asthma, Hay Fever or Seasonal Allergies: No  · Psoriasis or Psoriatic Arthritis: No  · Do any other medical conditions seem to run in your family? If Yes, what condition and which relatives?   No    Current Medications:         Current Outpatient Medications:     Cranberry 1000 MG CAPS, Take 1 tablet by mouth daily, Disp: , Rfl:     famotidine (PEPCID) 20 mg tablet, Take 1 tablet (20 mg total) by mouth 2 (two) times a day, Disp: 180 tablet, Rfl: 1    Magnesium 100 MG CAPS, Take 100 mg by mouth 2 (two) times a day, Disp: , Rfl:     metoprolol succinate (TOPROL-XL) 25 mg 24 hr tablet, Take 50 mg by mouth 2 (two) times a day Taking 100mg daily , Disp: , Rfl:     ibuprofen (MOTRIN) 400 mg tablet, Take 400 mg by mouth every 6 (six) hours as needed for mild pain (Patient not taking: Reported on 2/3/2022 ), Disp: , Rfl:     metoprolol tartrate (LOPRESSOR) 25 mg tablet, Take 25 mg by mouth every 12 (twelve) hours (Patient not taking: Reported on 2/3/2022 ), Disp: , Rfl:       Review of Systems:  Have you recently had or currently have any of the following? If YES, what are you doing for the problem? · Fever, chills or unintended weight loss: No  · Sudden loss or change in your vision: No  · Nausea, vomiting or blood in your stool: No  · Painful or swollen joints: No  · Wheezing or cough: No  · Changing mole or non-healing wound: No  · Nosebleeds: No  · Excessive sweating: No  · Easy or prolonged bleeding? No  · Over the last 2 weeks, how often have you been bothered by the following problems? · Taking little interest or pleasure in doing things: 1 - Not at All  · Feeling down, depressed, or hopeless: 1 - Not at All  · Rapid heartbeat with epinephrine:  No    · FEMALES ONLY:    · Are you pregnant or planning to become pregnant? No  · Are you currently or planning to be nursing or breast feeding? No    · Any known allergies? Allergies   Allergen Reactions    Penicillins Hives    Shellfish-Derived Products - Food Allergy Swelling   ·       Physical Exam:     Was a chaperone (Derm Clinical Assistant) present throughout the entire Physical Exam? Yes     Did the Dermatology Team specifically  the patient on the importance of a Full Skin Exam to be sure that nothing is missed clinically?  Yes}  o Did the patient ultimately request or accept a Full Skin Exam?  NO  o Did the patient specifically refuse to have the areas "under-the-bra" examined by the Dermatologist? No  o Did the patient specifically refuse to have the areas "under-the-underwear" examined by the Dermatologist? No    CONSTITUTIONAL:   Vitals:    02/22/22 1356   Temp: 97 9 °F (36 6 °C)   TempSrc: Temporal   Weight: 64 4 kg (142 lb)   Height: 5' 2" (1 575 m)       PSYCH: Normal mood and affect  EYES: Normal conjunctiva  ENT: Normal lips and oral mucosa  CARDIOVASCULAR: No edema  RESPIRATORY: Normal respirations  HEME/LYMPH/IMMUNO:  No regional lymphadenopathy except as noted below in "ASSESSMENT AND PLAN BY DIAGNOSIS"    SKIN:  FULL ORGAN SYSTEM EXAM   Hair, Scalp, Ears, Face Normal except as noted below in Assessment   Neck, Cervical Chain Nodes Normal except as noted below in Assessment   Right Arm/Hand/Fingers Normal except as noted below in Assessment   Left Arm/Hand/Fingers Normal except as noted below in Assessment   Chest/Breasts/Axillae Viewed areas Normal except as noted below in Assessment   Abdomen, Umbilicus Normal except as noted below in Assessment   Back/Spine Normal except as noted below in Assessment   Groin/Genitalia/Buttocks Normal except as noted below in Assessment   Right Leg, Foot, Toes Normal except as noted below in Assessment   Left Leg, Foot, Toes Normal except as noted below in Assessment        Assessment and Plan by Diagnosis:    History of Present Condition:     Duration:  How long has this been an issue for you?    o  3 months    Location Affected:  Where on the body is this affecting you?    o  Back    Quality:  Is there any bleeding, pain, itch, burning/irritation, or redness associated with the skin lesion?    o  Denies   Severity:  Describe any bleeding, pain, itch, burning/irritation, or redness on a scale of 1 to 10 (with 10 being the worst)    o  1   Timing:  Does this condition seem to be there pretty constantly or do you notice it more at specific times throughout the day?    o  Denies   Context:  Have you ever noticed that this condition seems to be associated with specific activities you do?    o  Denies   Modifying Factors:    o Anything that seems to make the condition worse?    -  Denies  o What have you tried to do to make the condition better? -  Denies   Associated Signs and Symptoms:  Does this skin lesion seem to be associated with any of the following: DENIES        1  SEBORRHEIC KERATOSIS; NON-INFLAMED    Physical Exam:   Anatomic Location Affected:  Back    Morphological Description:  Flat and raised, waxy, smooth to warty textured, yellow to brownish-grey to dark brown to blackish, discrete, "stuck-on" appearing papules   Pertinent Positives:   Pertinent Negatives: Additional History of Present Condition:  Patient reports new bumps on the skin  Denies itch, burn, pain, bleeding or ulceration  Present constantly; nothing seems to make it worse or better  No prior treatment  Assessment and Plan:  Based on a thorough discussion of this condition and the management approach to it (including a comprehensive discussion of the known risks, side effects and potential benefits of treatment), the patient (family) agrees to implement the following specific plan:   Reassured benign    Continue to monitor for any changes    Continue annual skin checks     Seborrheic Keratosis  A seborrheic keratosis is a harmless warty spot that appears during adult life as a common sign of skin aging  Seborrheic keratoses can arise on any area of skin, covered or uncovered, with the usual exception of the palms and soles  They do not arise from mucous membranes  Seborrheic keratoses can have highly variable appearance  Seborrheic keratoses are extremely common  It has been estimated that over 90% of adults over the age of 61 years have one or more of them  They occur in males and females of all races, typically beginning to erupt in the 35s or 45s  They are uncommon under the age of 21 years  The precise cause of seborrhoeic keratoses is not known  Seborrhoeic keratoses are considered degenerative in nature  As time goes by, seborrheic keratoses tend to become more numerous  Some people inherit a tendency to develop a very large number of them; some people may have hundreds of them  The name "seborrheic keratosis" is misleading, because these lesions are not limited to a seborrhoeic distribution (scalp, mid-face, chest, upper back), nor are they formed from sebaceous glands, nor are they associated with sebum -- which is greasy  Seborrheic keratosis may also be called "SK," "Seb K," "basal cell papilloma," "senile wart," or "barnacle "      Researchers have noted:   Eruptive seborrhoeic keratoses can follow sunburn or dermatitis   Skin friction may be the reason they appear in body folds   Viral cause (e g , human papillomavirus) seems unlikely   Stable and clonal mutations or activation of FRFR3, PIK3CA, EPHRAIM, AKT1 and EGFR genes are found in seborrhoeic keratoses   Seborrhoeic keratosis can arise from solar lentigo   FRFR3 mutations also arise in solar lentigines  These mutations are associated with increased age and location on the head and neck, suggesting a role of ultraviolet radiation in these lesions   Seborrheic keratoses do not harbour tumour suppressor gene mutations   Epidermal growth factor receptor inhibitors, which are used to treat some cancers, often result in an increase in verrucal (warty) keratoses  There is no easy way to remove multiple lesions on a single occasion  Unless a specific lesion is "inflamed" and is causing pain or stinging/burning or is bleeding, most insurance companies do not authorize treatment        Scribe Attestation    I,:  Rowan Butts am acting as a scribe while in the presence of the attending physician :       I,:  Natalia Arora MD personally performed the services described in this documentation    as scribed in my presence :

## 2022-04-21 ENCOUNTER — OFFICE VISIT (OUTPATIENT)
Dept: FAMILY MEDICINE CLINIC | Facility: CLINIC | Age: 62
End: 2022-04-21
Payer: COMMERCIAL

## 2022-04-21 VITALS
DIASTOLIC BLOOD PRESSURE: 78 MMHG | RESPIRATION RATE: 18 BRPM | HEART RATE: 71 BPM | BODY MASS INDEX: 26.13 KG/M2 | OXYGEN SATURATION: 99 % | WEIGHT: 142 LBS | HEIGHT: 62 IN | TEMPERATURE: 96.1 F | SYSTOLIC BLOOD PRESSURE: 120 MMHG

## 2022-04-21 DIAGNOSIS — E55.9 VITAMIN D DEFICIENCY: ICD-10-CM

## 2022-04-21 DIAGNOSIS — I10 HYPERTENSION, UNSPECIFIED TYPE: ICD-10-CM

## 2022-04-21 DIAGNOSIS — E78.2 MIXED HYPERLIPIDEMIA: Primary | ICD-10-CM

## 2022-04-21 DIAGNOSIS — M54.50 CHRONIC LEFT-SIDED LOW BACK PAIN WITHOUT SCIATICA: ICD-10-CM

## 2022-04-21 DIAGNOSIS — Z11.59 ENCOUNTER FOR HEPATITIS C SCREENING TEST FOR LOW RISK PATIENT: ICD-10-CM

## 2022-04-21 DIAGNOSIS — Z12.31 ENCOUNTER FOR SCREENING MAMMOGRAM FOR MALIGNANT NEOPLASM OF BREAST: ICD-10-CM

## 2022-04-21 DIAGNOSIS — G89.29 CHRONIC LEFT-SIDED LOW BACK PAIN WITHOUT SCIATICA: ICD-10-CM

## 2022-04-21 DIAGNOSIS — I44.2 AIVR (ACCELERATED IDIOVENTRICULAR RHYTHM) (HCC): ICD-10-CM

## 2022-04-21 DIAGNOSIS — Z79.899 ENCOUNTER FOR LONG-TERM (CURRENT) USE OF MEDICATIONS: ICD-10-CM

## 2022-04-21 DIAGNOSIS — Z11.59 NEED FOR HEPATITIS C SCREENING TEST: ICD-10-CM

## 2022-04-21 DIAGNOSIS — Z11.4 ENCOUNTER FOR SCREENING FOR HUMAN IMMUNODEFICIENCY VIRUS (HIV): ICD-10-CM

## 2022-04-21 PROCEDURE — 99205 OFFICE O/P NEW HI 60 MIN: CPT | Performed by: FAMILY MEDICINE

## 2022-04-21 PROCEDURE — 3725F SCREEN DEPRESSION PERFORMED: CPT | Performed by: FAMILY MEDICINE

## 2022-04-21 PROCEDURE — 3008F BODY MASS INDEX DOCD: CPT | Performed by: FAMILY MEDICINE

## 2022-04-21 PROCEDURE — 1036F TOBACCO NON-USER: CPT | Performed by: FAMILY MEDICINE

## 2022-04-21 RX ORDER — MELOXICAM 15 MG/1
15 TABLET ORAL DAILY
Qty: 30 TABLET | Refills: 2 | Status: SHIPPED | OUTPATIENT
Start: 2022-04-21 | End: 2022-06-20

## 2022-04-21 NOTE — PROGRESS NOTES
Assessment/Plan:  64 yo female, new pt to me, but followed with Dr Whit Maradiaga, presents for eval and f/u of the following medical issues:    BMI Counseling: There is no height or weight on file to calculate BMI  The BMI is above normal  Nutrition recommendations include decreasing portion sizes, encouraging healthy choices of fruits and vegetables, decreasing fast food intake, consuming healthier snacks, limiting drinks that contain sugar, moderation in carbohydrate intake, increasing intake of lean protein, reducing intake of saturated and trans fat and reducing intake of cholesterol  Exercise recommendations include moderate physical activity 150 minutes/week and exercising 3-5 times per week  No pharmacotherapy was ordered  Rationale for BMI follow-up plan is due to patient being overweight or obese  1  Mixed hyperlipidemia  -     Lipid panel; Future    2  Hypertension, unspecified type  -     CBC and differential; Future  -     Comprehensive metabolic panel; Future  -     UA w Reflex to Microscopic w Reflex to Culture -Lab Collect; Future; Expected date: 04/21/2022  -     TSH, 3rd generation with Free T4 reflex; Future    3  Encounter for long-term (current) use of medications  -     CBC and differential; Future  -     Comprehensive metabolic panel; Future  -     Hepatitis C antibody; Future  -     Lipid panel; Future  -     Vitamin D 25 hydroxy; Future  -     UA w Reflex to Microscopic w Reflex to Culture -Lab Collect; Future; Expected date: 04/21/2022  -     TSH, 3rd generation with Free T4 reflex; Future    4  Encounter for hepatitis C screening test for low risk patient  -     Hepatitis C antibody; Future    5  Encounter for screening for human immunodeficiency virus (HIV)    6  Vitamin D deficiency  -     Vitamin D 25 hydroxy; Future    7  Encounter for screening mammogram for malignant neoplasm of breast  -     Mammo screening bilateral w cad; Future; Expected date: 04/21/2022    8   Chronic left-sided low back pain without sciatica  Comments:  onset 2 yr ago  To chiropractor  MRI - 'was bad"  Got JNJ vacc Covid in Aug, 2021  Got lym nodes bilat, last 10 days, comes and oges  Then "deep ache" in upper   Orders:  -     CBC and differential; Future  -     Comprehensive metabolic panel; Future  -     Hepatitis C antibody; Future  -     Lipid panel; Future  -     Vitamin D 25 hydroxy; Future  -     UA w Reflex to Microscopic w Reflex to Culture -Lab Collect; Future; Expected date: 04/21/2022  -     TSH, 3rd generation with Free T4 reflex; Future  -     Mammo screening bilateral w cad; Future; Expected date: 04/21/2022  -     meloxicam (MOBIC) 15 mg tablet; Take 1 tablet (15 mg total) by mouth daily    9  Need for hepatitis C screening test  -     Hepatitis C antibody; Future    10  AIVR (accelerated idioventricular rhythm) (HCC)        Subjective:      Patient ID: Rigoberto Knight is a 63 y o  female  HPI    The following portions of the patient's history were reviewed and updated as appropriate: She  has a past medical history of AIVR (accelerated idioventricular rhythm) ([facility] [address] ), Allergic, PVC (premature ventricular contraction), and SVT (supraventricular tachycardia) ([facility] [address] )  She   Patient Active Problem List    Diagnosis Date Noted    Chronic left-sided low back pain without sciatica 04/21/2022    AIVR (accelerated idioventricular rhythm) ([facility] [address] ) 09/23/2021     She  has a past surgical history that includes Colonoscopy and Upper gastrointestinal endoscopy  Her family history includes No Known Problems in her father and mother  She  reports that she has quit smoking  Her smoking use included cigarettes  She smoked 0 00 packs per day for 0 00 years  She has never used smokeless tobacco  She reports previous alcohol use  She reports that she does not use drugs    Current Outpatient Medications   Medication Sig Dispense Refill    Cranberry 1000 MG CAPS Take 1 tablet by mouth daily      famotidine (PEPCID) 20 mg tablet Take 1 tablet (20 mg total) by mouth 2 (two) times a day 180 tablet 1    Magnesium 100 MG CAPS Take 100 mg by mouth 2 (two) times a day      meloxicam (MOBIC) 15 mg tablet Take 1 tablet (15 mg total) by mouth daily 30 tablet 2    metoprolol succinate (TOPROL-XL) 25 mg 24 hr tablet Take 50 mg by mouth 2 (two) times a day Taking 100mg daily        No current facility-administered medications for this visit  Current Outpatient Medications on File Prior to Visit   Medication Sig    Cranberry 1000 MG CAPS Take 1 tablet by mouth daily    famotidine (PEPCID) 20 mg tablet Take 1 tablet (20 mg total) by mouth 2 (two) times a day    Magnesium 100 MG CAPS Take 100 mg by mouth 2 (two) times a day    metoprolol succinate (TOPROL-XL) 25 mg 24 hr tablet Take 50 mg by mouth 2 (two) times a day Taking 100mg daily     [DISCONTINUED] ibuprofen (MOTRIN) 400 mg tablet Take 400 mg by mouth every 6 (six) hours as needed for mild pain (Patient not taking: Reported on 2/3/2022 )    [DISCONTINUED] metoprolol tartrate (LOPRESSOR) 25 mg tablet Take 25 mg by mouth every 12 (twelve) hours (Patient not taking: Reported on 2/3/2022 )     No current facility-administered medications on file prior to visit  She is allergic to penicillins and shellfish-derived products - food allergy       Review of Systems   Constitutional: Negative  HENT: Negative  Eyes: Negative  Respiratory: Negative  Cardiovascular: Negative  Sees Dr Tavo Matthew , EP, for arrhythmia and stable   Gastrointestinal: Negative  Endocrine: Negative  Genitourinary: Negative  Musculoskeletal: Positive for arthralgias, back pain and myalgias  Neurological: Negative  Hematological: Negative  All other systems reviewed and are negative          Objective:      /78   Pulse 71   Temp (!) 96 1 °F (35 6 °C)   Resp 18   Ht 5' 2" (1 575 m)   Wt 64 4 kg (142 lb)   SpO2 99%   BMI 25 97 kg/m²          Physical Exam  Constitutional:       Appearance: Normal appearance  She is well-developed  HENT:      Head: Normocephalic  Right Ear: External ear normal       Left Ear: External ear normal       Nose: Nose normal       Mouth/Throat:      Mouth: Mucous membranes are moist    Eyes:      General: No scleral icterus  Extraocular Movements: Extraocular movements intact  Conjunctiva/sclera: Conjunctivae normal       Pupils: Pupils are equal, round, and reactive to light  Neck:      Thyroid: No thyroid mass or thyromegaly  Vascular: No carotid bruit or JVD  Trachea: Trachea normal    Cardiovascular:      Rate and Rhythm: Normal rate and regular rhythm  Heart sounds: Normal heart sounds  No murmur heard  Pulmonary:      Effort: Pulmonary effort is normal       Breath sounds: Normal breath sounds  Abdominal:      General: Bowel sounds are normal  There is no distension  Palpations: Abdomen is soft  There is no mass  Hernia: No hernia is present  Musculoskeletal:         General: Normal range of motion  Cervical back: Full passive range of motion without pain, normal range of motion and neck supple  No edema  Comments: Pain upper back mid scapular region, has tenderness paraspinous region on the left  No rash to suggest herpes zoster  Lungs are clear  BUT, pt feels the "ache" or "pain" is "deep" -- in middle ot upper torso   Lymphadenopathy:      Cervical: No cervical adenopathy  Skin:     General: Skin is warm  Neurological:      General: No focal deficit present  Mental Status: She is alert and oriented to person, place, and time  Cranial Nerves: No cranial nerve deficit  Sensory: No sensory deficit  Motor: No abnormal muscle tone  Coordination: Coordination normal       Deep Tendon Reflexes: Reflexes normal    Psychiatric:         Behavior: Behavior normal          Thought Content:  Thought content normal          Justyna pt seen from 9:40 to 10:24 a m without interruption  First time seeing her and very fascinating history, maria elena post-Covid vacc ([facility]NJ) given in Aug 2021  She cc is deep in chest "ache", likely some "nerve" issue, not defined by the sed rate and crp (both normal)  Will rx as laid out  RTOin 6 wks  NOTE: all of the above issues discussed include chronic illness(es)  with severe exacerbations OR pose threats to life or body function if not managed/monitored effectively  I am as concerned about the long term effects of these disease states, as much as the short term effects  I spent considerable time assuring that my patient  understands  All labs discussed, maria elena the crp and sed rate  Will repeat all  I reviewed prior internal and external notes,  consults,  hospital discharges,  and any other appropriate documents  I  have discussed the management and interpretation of them as well  Again, patient expresses understanding

## 2022-04-21 NOTE — PATIENT INSTRUCTIONS
Musculoskeletal Pain   WHAT YOU NEED TO KNOW:   Musculoskeletal pain can occur in muscles, bones, ligaments, tendons, or nerves  The pain can be dull, achy, or sharp  You may have pain and tenderness to the touch as well  The pain can occur anywhere in your body  Musculoskeletal pain can be from an injury, or a medical condition such as polymyositis  DISCHARGE INSTRUCTIONS:   Return to the emergency department if:   · You have severe pain when you move the area  · You lose feeling in the area  · You have new or worse pain or swelling in the area  Your skin may feel tight  Call your doctor or pain specialist if:   · You have a fever  · You have pain that does not get better with treatment  · You have trouble sleeping because of your pain  · Your painful area becomes more tender, red, and warm to the touch  · You have less movement of the painful area  · You have questions or concerns about your condition or care  Self-care:   · Rest as directed  Avoid activity that causes pain  You may be able to return to normal activity when you can move without pain  Follow directions for rest and activity  You are at risk for injury for 3 weeks after your symptoms go away  · Ice the painful area to decrease pain and swelling  Use an ice pack, or put ice in a plastic bag and cover it with a towel  Always  put a cloth between the ice and your skin  Apply the ice as often as directed for the first 24 to 48 hours  · Apply compression to the area, if directed  Your healthcare provider may want you to use a splint, brace, or elastic bandage  Compression helps decrease pain and swelling in an arm or leg  A splint, brace, or bandage will also help protect the painful area when you move around  · Elevate a painful arm or leg to reduce swelling and pain  Elevate your limb while you are sitting or lying  Prop a painful leg on pillows to keep it above the level of your heart            Medicines: You may need any of the following:  · NSAIDs  help decrease swelling and pain or fever  This medicine is available with or without a doctor's order  NSAIDs can cause stomach bleeding or kidney problems in certain people  If you take blood thinner medicine, always ask your healthcare provider if NSAIDs are safe for you  Always read the medicine label and follow directions  · Acetaminophen  decreases pain and fever  It is available without a doctor's order  Ask how much to take and how often to take it  Follow directions  Read the labels of all other medicines you are using to see if they also contain acetaminophen, or ask your doctor or pharmacist  Acetaminophen can cause liver damage if not taken correctly  Do not use more than 4 grams (4,000 milligrams) total of acetaminophen in one day  · Muscle relaxers  help relax your muscles to decrease pain and muscle spasms  · Steroids  may be given to decrease redness, pain, and swelling  · Take your medicine as directed  Contact your healthcare provider if you think your medicine is not helping or if you have side effects  Tell him or her if you are allergic to any medicine  Keep a list of the medicines, vitamins, and herbs you take  Include the amounts, and when and why you take them  Bring the list or the pill bottles to follow-up visits  Carry your medicine list with you in case of an emergency  Follow up with your doctor or pain specialist as directed: You may need more tests to help healthcare providers find the cause of your muscle pain  You may need physical therapy to learn muscle strengthening exercises  Write down your questions so you remember to ask them during your visits  © Copyright [employer] 2022 Information is for End User's use only and may not be sold, redistributed or otherwise used for commercial purposes   All illustrations and images included in CareNotes® are the copyrighted property of A [employer] Inc  or [facility]  The above information is an  only  It is not intended as medical advice for individual conditions or treatments  Talk to your doctor, nurse or pharmacist before following any medical regimen to see if it is safe and effective for you

## 2022-04-22 ENCOUNTER — APPOINTMENT (OUTPATIENT)
Dept: LAB | Facility: CLINIC | Age: 62
End: 2022-04-22
Payer: COMMERCIAL

## 2022-04-22 DIAGNOSIS — Z11.59 ENCOUNTER FOR HEPATITIS C SCREENING TEST FOR LOW RISK PATIENT: ICD-10-CM

## 2022-04-22 DIAGNOSIS — I10 HYPERTENSION, UNSPECIFIED TYPE: ICD-10-CM

## 2022-04-22 DIAGNOSIS — G89.29 CHRONIC LEFT-SIDED LOW BACK PAIN WITHOUT SCIATICA: ICD-10-CM

## 2022-04-22 DIAGNOSIS — Z79.899 ENCOUNTER FOR LONG-TERM (CURRENT) USE OF MEDICATIONS: ICD-10-CM

## 2022-04-22 DIAGNOSIS — E55.9 VITAMIN D DEFICIENCY: ICD-10-CM

## 2022-04-22 DIAGNOSIS — E78.2 MIXED HYPERLIPIDEMIA: ICD-10-CM

## 2022-04-22 DIAGNOSIS — M54.50 CHRONIC LEFT-SIDED LOW BACK PAIN WITHOUT SCIATICA: ICD-10-CM

## 2022-04-22 DIAGNOSIS — Z11.59 NEED FOR HEPATITIS C SCREENING TEST: ICD-10-CM

## 2022-04-22 LAB
25(OH)D3 SERPL-MCNC: 30.4 NG/ML (ref 30–100)
ALBUMIN SERPL BCP-MCNC: 3.8 G/DL (ref 3.5–5)
ALP SERPL-CCNC: 43 U/L (ref 46–116)
ALT SERPL W P-5'-P-CCNC: 32 U/L (ref 12–78)
ANION GAP SERPL CALCULATED.3IONS-SCNC: 10 MMOL/L (ref 4–13)
AST SERPL W P-5'-P-CCNC: 17 U/L (ref 5–45)
BASOPHILS # BLD AUTO: 0.04 THOUSANDS/ΜL (ref 0–0.1)
BASOPHILS NFR BLD AUTO: 1 % (ref 0–1)
BILIRUB SERPL-MCNC: 0.41 MG/DL (ref 0.2–1)
BILIRUB UR QL STRIP: NEGATIVE
BUN SERPL-MCNC: 18 MG/DL (ref 5–25)
CALCIUM SERPL-MCNC: 8.9 MG/DL (ref 8.3–10.1)
CHLORIDE SERPL-SCNC: 104 MMOL/L (ref 100–108)
CHOLEST SERPL-MCNC: 214 MG/DL
CLARITY UR: CLEAR
CO2 SERPL-SCNC: 28 MMOL/L (ref 21–32)
COLOR UR: NORMAL
CREAT SERPL-MCNC: 0.8 MG/DL (ref 0.6–1.3)
EOSINOPHIL # BLD AUTO: 0.13 THOUSAND/ΜL (ref 0–0.61)
EOSINOPHIL NFR BLD AUTO: 2 % (ref 0–6)
ERYTHROCYTE [DISTWIDTH] IN BLOOD BY AUTOMATED COUNT: 13.3 % (ref 11.6–15.1)
GFR SERPL CREATININE-BSD FRML MDRD: 80 ML/MIN/1.73SQ M
GLUCOSE P FAST SERPL-MCNC: 93 MG/DL (ref 65–99)
GLUCOSE UR STRIP-MCNC: NEGATIVE MG/DL
HCT VFR BLD AUTO: 42.4 % (ref 34.8–46.1)
HCV AB SER QL: NORMAL
HDLC SERPL-MCNC: 77 MG/DL
HGB BLD-MCNC: 13.5 G/DL (ref 11.5–15.4)
HGB UR QL STRIP.AUTO: NEGATIVE
IMM GRANULOCYTES # BLD AUTO: 0.03 THOUSAND/UL (ref 0–0.2)
IMM GRANULOCYTES NFR BLD AUTO: 0 % (ref 0–2)
KETONES UR STRIP-MCNC: NEGATIVE MG/DL
LDLC SERPL CALC-MCNC: 127 MG/DL (ref 0–100)
LEUKOCYTE ESTERASE UR QL STRIP: NEGATIVE
LYMPHOCYTES # BLD AUTO: 1.9 THOUSANDS/ΜL (ref 0.6–4.47)
LYMPHOCYTES NFR BLD AUTO: 22 % (ref 14–44)
MCH RBC QN AUTO: 29.7 PG (ref 26.8–34.3)
MCHC RBC AUTO-ENTMCNC: 31.8 G/DL (ref 31.4–37.4)
MCV RBC AUTO: 93 FL (ref 82–98)
MONOCYTES # BLD AUTO: 0.45 THOUSAND/ΜL (ref 0.17–1.22)
MONOCYTES NFR BLD AUTO: 5 % (ref 4–12)
NEUTROPHILS # BLD AUTO: 6.13 THOUSANDS/ΜL (ref 1.85–7.62)
NEUTS SEG NFR BLD AUTO: 70 % (ref 43–75)
NITRITE UR QL STRIP: NEGATIVE
NONHDLC SERPL-MCNC: 137 MG/DL
NRBC BLD AUTO-RTO: 0 /100 WBCS
PH UR STRIP.AUTO: 7 [PH]
PLATELET # BLD AUTO: 248 THOUSANDS/UL (ref 149–390)
PMV BLD AUTO: 12 FL (ref 8.9–12.7)
POTASSIUM SERPL-SCNC: 4.6 MMOL/L (ref 3.5–5.3)
PROT SERPL-MCNC: 7.7 G/DL (ref 6.4–8.2)
PROT UR STRIP-MCNC: NEGATIVE MG/DL
RBC # BLD AUTO: 4.55 MILLION/UL (ref 3.81–5.12)
SODIUM SERPL-SCNC: 142 MMOL/L (ref 136–145)
SP GR UR STRIP.AUTO: 1.01 (ref 1–1.03)
TRIGL SERPL-MCNC: 49 MG/DL
TSH SERPL DL<=0.05 MIU/L-ACNC: 1.89 UIU/ML (ref 0.45–4.5)
UROBILINOGEN UR QL STRIP.AUTO: 0.2 E.U./DL
WBC # BLD AUTO: 8.68 THOUSAND/UL (ref 4.31–10.16)

## 2022-04-22 PROCEDURE — 84443 ASSAY THYROID STIM HORMONE: CPT

## 2022-04-22 PROCEDURE — 85025 COMPLETE CBC W/AUTO DIFF WBC: CPT

## 2022-04-22 PROCEDURE — 81003 URINALYSIS AUTO W/O SCOPE: CPT

## 2022-04-22 PROCEDURE — 86803 HEPATITIS C AB TEST: CPT

## 2022-04-22 PROCEDURE — 82306 VITAMIN D 25 HYDROXY: CPT

## 2022-04-22 PROCEDURE — 80061 LIPID PANEL: CPT

## 2022-04-22 PROCEDURE — 36415 COLL VENOUS BLD VENIPUNCTURE: CPT

## 2022-04-22 PROCEDURE — 80053 COMPREHEN METABOLIC PANEL: CPT

## 2022-04-28 ENCOUNTER — TELEPHONE (OUTPATIENT)
Dept: FAMILY MEDICINE CLINIC | Facility: CLINIC | Age: 62
End: 2022-04-28

## 2022-04-28 NOTE — TELEPHONE ENCOUNTER
Pt cancelled appt for today and stated that she would like you to know that the MELOXICAM helped for her lower back,   She stated that she had an MRI and found out she has herniated disc  T10,11 and 12  , she stated that she would like something for her upper back, she stated that she is going to [place] and would like to not be in pain

## 2022-04-29 DIAGNOSIS — M54.50 CHRONIC LEFT-SIDED LOW BACK PAIN WITHOUT SCIATICA: Primary | ICD-10-CM

## 2022-04-29 DIAGNOSIS — G89.29 CHRONIC LEFT-SIDED LOW BACK PAIN WITHOUT SCIATICA: Primary | ICD-10-CM

## 2022-04-29 RX ORDER — METHOCARBAMOL 750 MG/1
TABLET, FILM COATED ORAL
Qty: 60 TABLET | Refills: 1 | Status: SHIPPED | OUTPATIENT
Start: 2022-04-29 | End: 2022-06-16

## 2022-04-29 NOTE — TELEPHONE ENCOUNTER
Called patient and she said she will try it she said if she wasn't taking her grandbabies to [place] Monday she wouldn't take it but she said she is going to try and it only use as needed, she is going to get the report for her back for you

## 2022-04-29 NOTE — TELEPHONE ENCOUNTER
Lillian - tell her to continue the Mobic at 15 mg OD  I'd like to see the report of the MRI of the back (chiro apparently did? ? ) Not the actual films, but the report is good enuf  Tell her we could try a muscle relaxers, say Robaxin-750 mg 1/2 tab BID and 1 tab HS  Would she want to try that?

## 2022-05-16 ENCOUNTER — TELEPHONE (OUTPATIENT)
Dept: PAIN MEDICINE | Facility: CLINIC | Age: 62
End: 2022-05-16

## 2022-05-16 ENCOUNTER — CONSULT (OUTPATIENT)
Dept: PAIN MEDICINE | Facility: CLINIC | Age: 62
End: 2022-05-16
Payer: COMMERCIAL

## 2022-05-16 VITALS
BODY MASS INDEX: 26.13 KG/M2 | HEIGHT: 62 IN | HEART RATE: 64 BPM | WEIGHT: 142 LBS | DIASTOLIC BLOOD PRESSURE: 78 MMHG | SYSTOLIC BLOOD PRESSURE: 118 MMHG

## 2022-05-16 DIAGNOSIS — M51.36 DDD (DEGENERATIVE DISC DISEASE), LUMBAR: ICD-10-CM

## 2022-05-16 DIAGNOSIS — M54.9 MID BACK PAIN: ICD-10-CM

## 2022-05-16 DIAGNOSIS — M79.18 MYOFASCIAL PAIN: ICD-10-CM

## 2022-05-16 DIAGNOSIS — M47.816 LUMBAR SPONDYLOSIS: Primary | ICD-10-CM

## 2022-05-16 PROBLEM — M51.369 DDD (DEGENERATIVE DISC DISEASE), LUMBAR: Status: ACTIVE | Noted: 2022-05-16

## 2022-05-16 PROBLEM — M51.26 LUMBAR DISC HERNIATION: Status: ACTIVE | Noted: 2022-05-16

## 2022-05-16 PROCEDURE — 99204 OFFICE O/P NEW MOD 45 MIN: CPT | Performed by: ANESTHESIOLOGY

## 2022-05-16 NOTE — PROGRESS NOTES
Assessment  1  Lumbar spondylosis    2  Mid back pain    3  DDD (degenerative disc disease), lumbar    4  Myofascial pain        Plan  68-year-old female referred by Dr Shawna Ron, presenting for initial consultation regarding left-sided lumbosacral back pain without any radicular symptoms into her lower extremities since May 2020  Pain began when the patient was doing an exercise challenge  She also complains of mid back pain mostly left-sided which will occasionally radiate into the rib cage and chest   Patient states that she will occasionally feel palpitations with this  She does have a history of palpitations and does follow with Cardiology regarding this  Pain began September 2, 2021 when the patient sneezed  MRI of the thoracic spine from April 25, 2022 shows minimal disc bulging at T10-11 and T11-12 without any significant central or foraminal stenosis  MRI of the lumbar spine from August 17, 2020 shows small left-sided disc herniation at L1-2 to the left without any significant central or foraminal stenosis noted  Left-sided disc herniation at L4-5 with moderate foraminal stenosis and abutment of left L4 root  Facet arthrosis noted at L4-5 and L5-S1  Of note, I only have the reports of the MRIs, I do not have the actual images to review  The patient has done chiropractic treatment and has been doing physical therapy with some relief although this is transient  She has also tried meloxicam and methocarbamol  Meloxicam does provide some relief  Methocarbamol is ineffective  The patient's thoracic complaints seem to be mostly myofascial in nature  Patient's lumbar complaints also have myofascial components and possibly facet mediated components  No evidence of sacroiliitis or hip pathology on exam   No evidence of lumbar radiculopathy or myelopathy on exam       1  I will schedule the patient for trigger point injections to address her thoracic and lumbar paraspinal muscular spasms   2   May consider lumbar medial branch blocks in the future pending results of trigger point injections  3  Patient will continue with physical therapy as I feel she would benefit from core strengthening exercises for her thoracic and lumbar complaints   4  Patient will continue with meloxicam p r n   5  I will follow up the patient in 6 weeks        Complete risks and benefits including bleeding, infection, tissue reaction, nerve injury and allergic reaction were discussed  The approach was demonstrated using models and literature was provided  Verbal and written consent was obtained  My impressions and treatment recommendations were discussed in detail with the patient who verbalized understanding and had no further questions  Discharge instructions were provided  I personally saw and examined the patient and I agree with the above discussed plan of care  No orders of the defined types were placed in this encounter  No orders of the defined types were placed in this encounter  History of Present Illness    Meme Kitchen is a 63 y o  female referred by Dr Cris Nogueira, presenting for initial consultation regarding left-sided lumbosacral back pain without any radicular symptoms into her lower extremities since May 2020  Pain began when the patient was doing an exercise challenge  She also complains of mid back pain mostly left-sided which will occasionally radiate into the rib cage and chest   Patient states that she will occasionally feel palpitations with this  She does have a history of palpitations and does follow with Cardiology regarding this  Pain began September 2, 2021 when the patient sneezed  She denies any bladder or bowel incontinence or saddle anesthesia  MRI of the thoracic spine from April 25, 2022 shows minimal disc bulging at T10-11 and T11-12 without any significant central or foraminal stenosis    MRI of the lumbar spine from August 17, 2020 shows small left-sided disc herniation at L1-2 to the left without any significant central or foraminal stenosis noted  Left-sided disc herniation at L4-5 with moderate foraminal stenosis and abutment of left L4 root  Facet arthrosis noted at L4-5 and L5-S1  Of note, I only have the reports of the MRIs, I do not have the actual images to review  The patient has done chiropractic treatment and has been doing physical therapy with some relief although this is transient  She has also tried meloxicam and methocarbamol  Meloxicam does provide some relief  Methocarbamol is ineffective  The patient rates her pain a 9/10 at its worst and nearly constant  The pain is worse in the evening and described as burning, dull, aching, and pressure like  The pain is decreased with lying down, standing, and relaxation  The pain is increased with bending, sitting, exercise, and sneezing  She has also found some relief with heat and ice application and a 95J unit  Other than as stated above, the patient denies any interval changes in medications, medical condition, mental condition, symptoms, or allergies since the last office visit  I have personally reviewed and/or updated the patient's past medical history, past surgical history, family history, social history, current medications, allergies, and vital signs today  Review of Systems   Constitutional: Negative for fever and unexpected weight change  HENT: Negative for trouble swallowing  Eyes: Negative for visual disturbance  Respiratory: Negative for shortness of breath and wheezing  Cardiovascular: Positive for chest pain and palpitations  Gastrointestinal: Negative for constipation, diarrhea, nausea and vomiting  Endocrine: Negative for cold intolerance, heat intolerance and polydipsia  Genitourinary: Negative for difficulty urinating and frequency  Musculoskeletal: Negative for arthralgias, gait problem, joint swelling and myalgias  Skin: Negative for rash     Neurological: Negative for dizziness, seizures, syncope, weakness and headaches  Hematological: Does not bruise/bleed easily  Psychiatric/Behavioral: Negative for dysphoric mood  All other systems reviewed and are negative  Patient Active Problem List   Diagnosis    AIVR (accelerated idioventricular rhythm) (HCC)    Chronic left-sided low back pain without sciatica       Past Medical History:   Diagnosis Date    AIVR (accelerated idioventricular rhythm) (HCC)     Allergic     PVC (premature ventricular contraction)     SVT (supraventricular tachycardia) (HCC)        Past Surgical History:   Procedure Laterality Date    COLONOSCOPY      UPPER GASTROINTESTINAL ENDOSCOPY         Family History   Problem Relation Age of Onset    No Known Problems Mother     No Known Problems Father        Social History     Occupational History    Not on file   Tobacco Use    Smoking status: Former Smoker     Packs/day: 0 00     Years: 0 00     Pack years: 0 00     Types: Cigarettes    Smokeless tobacco: Never Used   Vaping Use    Vaping Use: Never used   Substance and Sexual Activity    Alcohol use: Not Currently     Alcohol/week: 0 0 standard drinks    Drug use: Never    Sexual activity: Not on file       Current Outpatient Medications on File Prior to Visit   Medication Sig    Cranberry 1000 MG CAPS Take 1 tablet by mouth daily    Magnesium 100 MG CAPS Take 100 mg by mouth 2 (two) times a day    meloxicam (MOBIC) 15 mg tablet Take 1 tablet (15 mg total) by mouth daily    methocarbamol (Robaxin-750) 750 mg tablet Take 1/2 tablet bid and take 1 tablet at HS    metoprolol succinate (TOPROL-XL) 25 mg 24 hr tablet Take 50 mg by mouth 2 (two) times a day Taking 100mg daily     famotidine (PEPCID) 20 mg tablet Take 1 tablet (20 mg total) by mouth 2 (two) times a day     No current facility-administered medications on file prior to visit         Allergies   Allergen Reactions    Penicillins Hives    Shellfish-Derived Products - Food Allergy Swelling       Physical Exam    /78   Pulse 64   Ht 5' 2" (1 575 m)   Wt 64 4 kg (142 lb)   BMI 25 97 kg/m²     Constitutional: normal, well developed, well nourished, alert, in no distress and non-toxic and no overt pain behavior  Eyes: anicteric  HEENT: grossly intact  Neck: supple, symmetric, trachea midline and no masses   Pulmonary:even and unlabored  Cardiovascular:No edema or pitting edema present  Skin:Normal without rashes or lesions and well hydrated  Psychiatric:Mood and affect appropriate  Neurologic:Cranial Nerves II-XII grossly intact  Musculoskeletal:normal gait  Bilateral thoracic and lumbar paraspinal musculature minimally tender to palpation, but ropy in texture  Bilateral SI joints nontender to palpation  Bilateral patellar and Achilles reflexes were 2/4 and symmetrical   No clonus was noted bilaterally  Bilateral lower extremity strength 5/5 in all muscle groups  Sensation intact to light touch in L3 through S1 dermatomes bilaterally  Negative straight leg raise bilaterally  Negative Randy's test and Gaenslen's test bilaterally      Imaging  Imaging reviewed as above

## 2022-05-17 ENCOUNTER — OFFICE VISIT (OUTPATIENT)
Dept: FAMILY MEDICINE CLINIC | Facility: CLINIC | Age: 62
End: 2022-05-17
Payer: COMMERCIAL

## 2022-05-17 VITALS
RESPIRATION RATE: 18 BRPM | SYSTOLIC BLOOD PRESSURE: 120 MMHG | TEMPERATURE: 96 F | OXYGEN SATURATION: 98 % | WEIGHT: 143 LBS | HEIGHT: 62 IN | BODY MASS INDEX: 26.31 KG/M2 | DIASTOLIC BLOOD PRESSURE: 76 MMHG | HEART RATE: 70 BPM

## 2022-05-17 DIAGNOSIS — G89.29 CHRONIC LEFT-SIDED LOW BACK PAIN WITHOUT SCIATICA: Primary | ICD-10-CM

## 2022-05-17 DIAGNOSIS — G47.30 SLEEP APNEA IN ADULT: ICD-10-CM

## 2022-05-17 DIAGNOSIS — I10 HYPERTENSION, UNSPECIFIED TYPE: ICD-10-CM

## 2022-05-17 DIAGNOSIS — Z79.899 ENCOUNTER FOR LONG-TERM (CURRENT) USE OF MEDICATIONS: ICD-10-CM

## 2022-05-17 DIAGNOSIS — M54.50 CHRONIC LEFT-SIDED LOW BACK PAIN WITHOUT SCIATICA: Primary | ICD-10-CM

## 2022-05-17 DIAGNOSIS — E55.9 VITAMIN D DEFICIENCY: ICD-10-CM

## 2022-05-17 PROCEDURE — 1036F TOBACCO NON-USER: CPT | Performed by: FAMILY MEDICINE

## 2022-05-17 PROCEDURE — 99214 OFFICE O/P EST MOD 30 MIN: CPT | Performed by: FAMILY MEDICINE

## 2022-05-17 NOTE — PROGRESS NOTES
Assessment/Plan:  63 y o female:  Saw Dr Phillip Rondon 2 yrs ago and DR Sylvia Ham yesterday and he thinks this is muscular in nature off the certer of the thoracic spine on the LEFT  Pt is R handed  Will have myofascial pain reflif trigger point injection in 2 days  By Dr Sylvia Ham  She is been through MRI of thoracic spine just 1 month ago which was fairly unremarkable as well as MRI of LS spine showing several what I think are fairly minor aberrations, disc protrusions etc   that are not causing her problems per se  We explored all the possible combinations of problems that might cause low back pain as well as upper thoracic pain--the way she drives, the way she sleeps, even sleep apnea which I think would be worthwhile pursuing given the fact that she does snore but we can look into that later as her  watches her sleep that night? She will let me know  A very long talk regarding medications the especially starting with Tylenol and NSAIDs many other possibilities being gabapentin and duloxetine moving up to tramadol and then of course narcotics fentanyl and methadone none of the last 3 our options of course  Nonetheless they were discussed and reviewed with patient  I would suggest Tylenol be the mainstay but at maximum dose 3 g per day with occasional use of Mobic for perhaps 7-10 days but not longer  1  Chronic left-sided low back pain without sciatica    2  Hypertension, unspecified type    3  Encounter for long-term (current) use of medications    4  Vitamin D deficiency  Comments:  Recent level was 30 4 , so I am advising 5000 International Units once daily for 3 months then 2000 there after indefinitely long discussion about    5  Sleep apnea in adult  Comments:  She may well have this but will have to think it over  It could be that that is the underlying cause of her muscle spasm as not sleeping properly etc         Subjective:      Patient ID: Nicolle Ferrari is a 63 y o  female      HPI    The following portions of the patient's history were reviewed and updated as appropriate: She  has a past medical history of AIVR (accelerated idioventricular rhythm) ([facility] [address] ), Allergic, PVC (premature ventricular contraction), and SVT (supraventricular tachycardia) ([facility] [address] )  She   Patient Active Problem List    Diagnosis Date Noted    Mid back pain 05/16/2022    Lumbar spondylosis 05/16/2022    Lumbar disc herniation 05/16/2022    DDD (degenerative disc disease), lumbar 05/16/2022    Myofascial pain 05/16/2022    Chronic left-sided low back pain without sciatica 04/21/2022    AIVR (accelerated idioventricular rhythm) ([facility] [address] ) 09/23/2021     She  has a past surgical history that includes Colonoscopy and Upper gastrointestinal endoscopy  Her family history includes No Known Problems in her father and mother  She  reports that she has quit smoking  Her smoking use included cigarettes  She smoked 0 00 packs per day for 0 00 years  She has never used smokeless tobacco  She reports previous alcohol use  She reports that she does not use drugs  Current Outpatient Medications   Medication Sig Dispense Refill    Cranberry 1000 MG CAPS Take 1 tablet by mouth daily      famotidine (PEPCID) 20 mg tablet Take 1 tablet (20 mg total) by mouth 2 (two) times a day 180 tablet 1    Magnesium 100 MG CAPS Take 100 mg by mouth 2 (two) times a day      meloxicam (MOBIC) 15 mg tablet Take 1 tablet (15 mg total) by mouth daily 30 tablet 2    methocarbamol (Robaxin-750) 750 mg tablet Take 1/2 tablet bid and take 1 tablet at HS 60 tablet 1    metoprolol succinate (TOPROL-XL) 25 mg 24 hr tablet Take 50 mg by mouth 2 (two) times a day Taking 100mg daily        No current facility-administered medications for this visit       Current Outpatient Medications on File Prior to Visit   Medication Sig    Cranberry 1000 MG CAPS Take 1 tablet by mouth daily    famotidine (PEPCID) 20 mg tablet Take 1 tablet (20 mg total) by mouth 2 (two) times a day    Magnesium 100 MG CAPS Take 100 mg by mouth 2 (two) times a day    meloxicam (MOBIC) 15 mg tablet Take 1 tablet (15 mg total) by mouth daily    methocarbamol (Robaxin-750) 750 mg tablet Take 1/2 tablet bid and take 1 tablet at HS    metoprolol succinate (TOPROL-XL) 25 mg 24 hr tablet Take 50 mg by mouth 2 (two) times a day Taking 100mg daily      No current facility-administered medications on file prior to visit  She is allergic to penicillins and shellfish-derived products - food allergy       Review of Systems   Constitutional: Negative  HENT: Negative  Eyes: Negative  Respiratory: Negative  Cardiovascular: Negative  Sees Dr Tavo Matthew , EP, for arrhythmia and stable   Gastrointestinal: Negative  Endocrine: Negative  Genitourinary: Negative  Musculoskeletal: Positive for arthralgias, back pain and myalgias  Neurological: Negative  Hematological: Negative  All other systems reviewed and are negative  Objective:      /76   Pulse 70   Temp (!) 96 °F (35 6 °C)   Resp 18   Ht 5' 2" (1 575 m)   Wt 64 9 kg (143 lb)   SpO2 98%   BMI 26 16 kg/m²          Physical Exam  Constitutional:       Appearance: Normal appearance  She is well-developed  HENT:      Head: Normocephalic  Right Ear: External ear normal       Left Ear: External ear normal       Nose: Nose normal       Mouth/Throat:      Mouth: Mucous membranes are moist    Eyes:      General: No scleral icterus  Extraocular Movements: Extraocular movements intact  Conjunctiva/sclera: Conjunctivae normal       Pupils: Pupils are equal, round, and reactive to light  Neck:      Thyroid: No thyroid mass or thyromegaly  Vascular: No carotid bruit or JVD  Trachea: Trachea normal    Cardiovascular:      Rate and Rhythm: Normal rate and regular rhythm  Heart sounds: Normal heart sounds  No murmur heard    Pulmonary:      Effort: Pulmonary effort is normal       Breath sounds: Normal breath sounds  Abdominal:      General: Bowel sounds are normal  There is no distension  Palpations: Abdomen is soft  There is no mass  Hernia: No hernia is present  Musculoskeletal:         General: Normal range of motion  Cervical back: Full passive range of motion without pain, normal range of motion and neck supple  No edema  Comments: Pain upper back mid scapular region, has tenderness paraspinous region on the left  No rash to suggest herpes zoster  Lungs are clear  BUT, pt feels the "ache" or "pain" is "deep" -- in middle ot upper torso   Lymphadenopathy:      Cervical: No cervical adenopathy  Skin:     General: Skin is warm  Neurological:      General: No focal deficit present  Mental Status: She is alert and oriented to person, place, and time  Cranial Nerves: No cranial nerve deficit  Sensory: No sensory deficit  Motor: No abnormal muscle tone  Coordination: Coordination normal       Deep Tendon Reflexes: Reflexes normal    Psychiatric:         Behavior: Behavior normal          Thought Content: Thought content normal            30 min--actually about 45 min - with pt and long discussion re muscle issues, spine issues,and EVEN VINITA  Consider sleep apnea as an inciting event to cause this pain the in L upper thoracic spine  ??? This time was spent reviewing previous records, reviewing previous laboratory and other tests, taking history from patient, examination of patient, discussion of prognosis and treatment, ordering laboratory tests, ordering medications, and completion of the medical record

## 2022-05-19 ENCOUNTER — PROCEDURE VISIT (OUTPATIENT)
Dept: PAIN MEDICINE | Facility: CLINIC | Age: 62
End: 2022-05-19
Payer: COMMERCIAL

## 2022-05-19 VITALS
HEART RATE: 69 BPM | HEIGHT: 62 IN | WEIGHT: 142 LBS | SYSTOLIC BLOOD PRESSURE: 121 MMHG | BODY MASS INDEX: 26.13 KG/M2 | DIASTOLIC BLOOD PRESSURE: 74 MMHG

## 2022-05-19 DIAGNOSIS — M79.18 MYOFASCIAL PAIN SYNDROME: Primary | ICD-10-CM

## 2022-05-19 PROCEDURE — 3008F BODY MASS INDEX DOCD: CPT | Performed by: FAMILY MEDICINE

## 2022-05-19 PROCEDURE — 20552 NJX 1/MLT TRIGGER POINT 1/2: CPT | Performed by: ANESTHESIOLOGY

## 2022-05-19 RX ORDER — TRIAMCINOLONE ACETONIDE 40 MG/ML
40 INJECTION, SUSPENSION INTRA-ARTICULAR; INTRAMUSCULAR ONCE
Status: COMPLETED | OUTPATIENT
Start: 2022-05-19 | End: 2022-05-19

## 2022-05-19 RX ORDER — BUPIVACAINE HYDROCHLORIDE 2.5 MG/ML
10 INJECTION, SOLUTION EPIDURAL; INFILTRATION; INTRACAUDAL ONCE
Status: COMPLETED | OUTPATIENT
Start: 2022-05-19 | End: 2022-05-19

## 2022-05-19 RX ADMIN — TRIAMCINOLONE ACETONIDE 40 MG: 40 INJECTION, SUSPENSION INTRA-ARTICULAR; INTRAMUSCULAR at 09:55

## 2022-05-19 RX ADMIN — BUPIVACAINE HYDROCHLORIDE 10 ML: 2.5 INJECTION, SOLUTION EPIDURAL; INFILTRATION; INTRACAUDAL at 09:54

## 2022-05-19 NOTE — PROGRESS NOTES
27-year-old female with a history of chronic myofascial pain presenting for trigger point injections in her left thoracic paraspinal musculature  After discussing the risks, benefits, and alternatives to the procedure, the patient expressed understanding and wished to proceed  Procedural pause conducted to verify:  correct patient identity, procedure to be performed and as applicable, correct side and site, correct patient position, and availability of implants, special equipment and special requirements  The appropriate hyper irritable musculoskeletal tender points were marked with a sterile pen, prepped with alcohol and sterilely draped  After appropriate reproduction of pain at each of the tender points marked, a total of 5 mL of 0 25% bupivacaine and 20 mg of triamcinolone was injected after negative aspiration of air, CSF, heme, or other bodily fluids with a 1 5 in 25-gauge needle  A total number of 1 muscle group was injected  The patient was discharged with no apparent complications on their own power after an appropriate observation

## 2022-05-26 ENCOUNTER — TELEPHONE (OUTPATIENT)
Dept: PAIN MEDICINE | Facility: CLINIC | Age: 62
End: 2022-05-26

## 2022-05-26 ENCOUNTER — RA CDI HCC (OUTPATIENT)
Dept: OTHER | Facility: HOSPITAL | Age: 62
End: 2022-05-26

## 2022-05-26 NOTE — PROGRESS NOTES
[facility]  coding opportunities       Chart reviewed, no opportunity found: CHART REVIEWED, NO OPPORTUNITY FOUND        Patients Insurance        Commercial Insurance: [employer]

## 2022-06-01 NOTE — TELEPHONE ENCOUNTER
Patient states 0% of improvement & pain level is 3-6/10-pt stated that she had some relief for about 2-3 days but not sure if that was the numbing or the procedure taking effect- pt is back at baseline pain prior procedure- thank you

## 2022-06-16 ENCOUNTER — OFFICE VISIT (OUTPATIENT)
Dept: PAIN MEDICINE | Facility: CLINIC | Age: 62
End: 2022-06-16
Payer: COMMERCIAL

## 2022-06-16 VITALS
WEIGHT: 139 LBS | HEART RATE: 64 BPM | DIASTOLIC BLOOD PRESSURE: 77 MMHG | HEIGHT: 62 IN | BODY MASS INDEX: 25.58 KG/M2 | SYSTOLIC BLOOD PRESSURE: 119 MMHG

## 2022-06-16 DIAGNOSIS — M79.18 MYOFASCIAL PAIN: ICD-10-CM

## 2022-06-16 DIAGNOSIS — M51.36 DDD (DEGENERATIVE DISC DISEASE), LUMBAR: ICD-10-CM

## 2022-06-16 DIAGNOSIS — M51.26 LUMBAR DISC HERNIATION: ICD-10-CM

## 2022-06-16 DIAGNOSIS — M54.9 MID BACK PAIN: Primary | ICD-10-CM

## 2022-06-16 DIAGNOSIS — M54.50 CHRONIC LEFT-SIDED LOW BACK PAIN WITHOUT SCIATICA: ICD-10-CM

## 2022-06-16 DIAGNOSIS — G89.29 CHRONIC LEFT-SIDED LOW BACK PAIN WITHOUT SCIATICA: ICD-10-CM

## 2022-06-16 DIAGNOSIS — M47.816 LUMBAR SPONDYLOSIS: ICD-10-CM

## 2022-06-16 PROCEDURE — 1036F TOBACCO NON-USER: CPT | Performed by: NURSE PRACTITIONER

## 2022-06-16 PROCEDURE — 3078F DIAST BP <80 MM HG: CPT | Performed by: NURSE PRACTITIONER

## 2022-06-16 PROCEDURE — 3008F BODY MASS INDEX DOCD: CPT | Performed by: NURSE PRACTITIONER

## 2022-06-16 PROCEDURE — 3074F SYST BP LT 130 MM HG: CPT | Performed by: NURSE PRACTITIONER

## 2022-06-16 PROCEDURE — 99214 OFFICE O/P EST MOD 30 MIN: CPT | Performed by: NURSE PRACTITIONER

## 2022-06-16 RX ORDER — TIZANIDINE 2 MG/1
2 TABLET ORAL EVERY 8 HOURS PRN
Qty: 30 TABLET | Refills: 1 | Status: SHIPPED | OUTPATIENT
Start: 2022-06-16

## 2022-06-16 NOTE — PROGRESS NOTES
Assessment:  1  Mid back pain    2  Lumbar spondylosis    3  Lumbar disc herniation    4  DDD (degenerative disc disease), lumbar    5  Chronic left-sided low back pain without sciatica    6  Myofascial pain        Plan:  1  I will discontinue methocarbamol and trial tizanidine 2mg Q8 hours PRN myofascial pain  2  Patient not interested in an oral steroid   3  Patient will [facility] with [facility]  4  Patient may continue meloxicam as prescribed  5  May consider a trial of gabapentin in the future   6  Patient will follow up on a p r n  basis at this time as requested      M*Modal software was used to dictate this note  It may contain errors with dictating incorrect words or incorrect spelling  Please contact the provider directly with any questions  History of Present Illness: The patient is a 63 y o  female last seen on 5/16/22 who presents for a follow up office visit in regards to chronic left-sided thoracic back pain near the scapula that will occasionally radiate into the ribcage and chest wall  Patient does have a history of lumbosacral back pain but she states it is minimal at this time  She states her mid back pain began when she sneezed on September 2, 2021 which also coincided with the time she got her COVID vaccine  She denies bowel or bladder incontinence or saddle anesthesia  She is status post trigger point injections into the left thoracic paraspinal musculature on May 19, 2022 without any significant improvement of her pain  MRI of the thoracic spine from April 25, 2022 shows minimal disc bulging at T10-11 and T11-12 without any significant central or foraminal stenosis    Patient has been participating in physical therapy through [facility] with transient improvement  She has tried methocarbamol without much relief  She was also prescribed meloxicam by her PCP which she states improved her lumbar pain but not this thoracic pain      Patient rates her pain at 2/10 on the numeric pain rating scale  She intermittently has pain in the evening which is described as throbbing and pressure-like    I have personally reviewed and/or updated the patient's past medical history, past surgical history, family history, social history, current medications, allergies, and vital signs today  Review of Systems:    Review of Systems   Respiratory: Negative for shortness of breath  Cardiovascular: Negative for chest pain  Gastrointestinal: Negative for constipation, diarrhea, nausea and vomiting  Musculoskeletal: Negative for arthralgias, gait problem, joint swelling and myalgias  Skin: Negative for rash  Neurological: Negative for dizziness, seizures and weakness  All other systems reviewed and are negative          Past Medical History:   Diagnosis Date    AIVR [facility])     Allergic     PVC (premature ventricular contraction)     SVT (supraventricular tachycardia) ([facility])        Past Surgical History:   Procedure Laterality Date    COLONOSCOPY      UPPER GASTROINTESTINAL ENDOSCOPY         Family History   Problem Relation Age of Onset    No Known Problems Mother     No Known Problems Father        Social History     Occupational History    Not on file   Tobacco Use    Smoking status: Former Smoker     Packs/day: 0 00     Years: 0 00     Pack years: 0 00     Types: Cigarettes    Smokeless tobacco: Never Used   Vaping Use    Vaping Use: Never used   Substance and Sexual Activity    Alcohol use: Not Currently     Alcohol/week: 0 0 standard drinks    Drug use: Never    Sexual activity: Not on file         Current Outpatient Medications:     Cranberry 1000 MG CAPS, Take 1 tablet by mouth daily, Disp: , Rfl:     Magnesium 100 MG CAPS, Take 100 mg by mouth 2 (two) times a day, Disp: , Rfl:     metoprolol succinate (TOPROL-XL) 25 mg 24 hr tablet, Take 50 mg by mouth 2 (two) times a day Taking 100mg daily , Disp: , Rfl:    tiZANidine (ZANAFLEX) 2 mg tablet, Take 1 tablet (2 mg total) by mouth every 8 (eight) hours as needed for muscle spasms, Disp: 30 tablet, Rfl: 1    famotidine (PEPCID) 20 mg tablet, Take 1 tablet (20 mg total) by mouth 2 (two) times a day, Disp: 180 tablet, Rfl: 1    meloxicam (MOBIC) 15 mg tablet, Take 1 tablet (15 mg total) by mouth daily (Patient not taking: No sig reported), Disp: 30 tablet, Rfl: 2    Allergies   Allergen Reactions    Penicillins Hives    Shellfish-Derived Products - Food Allergy Swelling       Physical Exam:    /77   Pulse 64   Ht 5' 2" (1 575 m)   Wt 63 kg (139 lb)   BMI 25 42 kg/m²     Constitutional:normal, well developed, well nourished, alert, in no distress and non-toxic and no overt pain behavior  Eyes:anicteric  HEENT:grossly intact  Neck:supple, symmetric, trachea midline and no masses   Pulmonary:even and unlabored  Cardiovascular:No edema or pitting edema present  Skin:Normal without rashes or lesions and well hydrated  Psychiatric:Mood and affect appropriate  Neurologic:Cranial Nerves II-XII grossly intact  Musculoskeletal:normal gait      Imaging  No orders to display   Imaging reviewed      No orders of the defined types were placed in this encounter

## 2022-09-07 ENCOUNTER — TELEPHONE (OUTPATIENT)
Dept: PAIN MEDICINE | Facility: CLINIC | Age: 62
End: 2022-09-07

## 2022-09-15 DIAGNOSIS — G89.29 CHRONIC LEFT-SIDED LOW BACK PAIN WITHOUT SCIATICA: ICD-10-CM

## 2022-09-15 DIAGNOSIS — M54.50 CHRONIC LEFT-SIDED LOW BACK PAIN WITHOUT SCIATICA: ICD-10-CM

## 2022-09-15 DIAGNOSIS — Z12.31 ENCOUNTER FOR SCREENING MAMMOGRAM FOR MALIGNANT NEOPLASM OF BREAST: ICD-10-CM

## 2023-02-09 ENCOUNTER — PREP FOR PROCEDURE (OUTPATIENT)
Dept: GASTROENTEROLOGY | Facility: AMBULARY SURGERY CENTER | Age: 63
End: 2023-02-09

## 2023-02-09 ENCOUNTER — TELEPHONE (OUTPATIENT)
Dept: GASTROENTEROLOGY | Facility: AMBULARY SURGERY CENTER | Age: 63
End: 2023-02-09

## 2023-02-09 DIAGNOSIS — K21.9 GASTROESOPHAGEAL REFLUX DISEASE, UNSPECIFIED WHETHER ESOPHAGITIS PRESENT: Primary | ICD-10-CM

## 2023-02-09 NOTE — TELEPHONE ENCOUNTER
OA Questions for EGD  Date: [2-9-2023  ]  Screened by: [ Sammie Garcia ]     Referring Provider: Dr James Wilson        Pre-Screening: BMI [  ]    Past EGD? If yes - Date: years ago 21 years ago       SCHEDULING STAFF: If the patient is over 76years old, please schedule an office visit  ·      Does the patient want to see a gastroenterologist prior to their procedure to discuss any GI symptoms? NO  ·      Has the patient been hospitalized or had abdominal surgery in the past 6 months? NO  ·      Does the patient use supplemental oxygen? NO  ·      Does the patient take [Coumadin], [Lovenox], [Plavix], [Eliquis], [Xarelto], or other blood thinning medication? NO  ·      Has the patient had a stroke, cardiac event, or stent placed in the past year? No    Passed OA EGD      SCHEDULING STAFF: If patient answers NO to the above questions, then schedule the procedure  If patient answers YES to any of the above questions, then schedule an office appointment  ·       If a repeat EGD is belated and patient declines procedure à notify provider

## 2023-02-09 NOTE — TELEPHONE ENCOUNTER
[facility] Assessment    Name: Arthur Melendez  YOB: 1960  Last Height: 5' 2" (1 575 m)  Last weight: 63 kg (139 lb)  BMI: 25 42 kg/m²  Procedure: egd  Diagnosis:   Date of procedure: 03-  Prep: ?  Responsible : yes to be determined  Phone#:   Name completing form: Jenni Bartlett  Date form completed: 02/09/23      If the patient answers yes to any of these questions, schedule in a hospital  Are you pregnant: No  Do you rely on a wheelchair for mobility: No  Have you been diagnosed with End Stage Renal Disease (ESRD): No  Do you need oxygen during the day: No  Have you had a heart attack or stroke within the past three months: No  Have you had a seizure within the past three months: No  Have you ever been informed by anesthesia that you have a difficult airway: No  Additional Questions  Have you had any cardiac testing or are under the care of a Cardiologist (see cardiac list): Yes (Comment: Obtain Cardiac Clearance)  Cardiac list:   Do you have an implanted cardiac defibrillator: No (Comment:  This patient should be scheduled in the hospital)    Have any bleeding problems, such as anemia or hemophilia (If patient has H&H result below 8, schedule in hospital   H&H must be within 30 days of procedure): No    Had an organ transplant within the past 3 months: No    Do you have any present infections: No  Do you get short of breath when walking a few blocks: No  Have you been diagnosed with diabetes: No  Comments (provide cardiac provider information if applicable):

## 2023-02-09 NOTE — TELEPHONE ENCOUNTER
Scheduled date of EGD(as of today): 03-   Physician performing EGD: Dahlia   Location of EGD: [facility]   Instructions reviewed with patient by:?   Clearances: Cardiologist Dr Joe Malave with  [phone]

## 2023-02-10 ENCOUNTER — TELEPHONE (OUTPATIENT)
Dept: GASTROENTEROLOGY | Facility: CLINIC | Age: 63
End: 2023-02-10

## 2023-02-10 NOTE — TELEPHONE ENCOUNTER
I lmom informing pt that I would be sending her the instructions for the EGD via email and asked her to please call if does not receive or if has any questions

## 2023-02-10 NOTE — TELEPHONE ENCOUNTER
Faxed cardiac clearance to Dr Melva Carroll of [facility] [phone]  Will call their office in one week to make sure clearance request was received if do not see anything in care everywhere chart

## 2023-02-12 ENCOUNTER — TELEPHONE (OUTPATIENT)
Dept: OTHER | Facility: OTHER | Age: 63
End: 2023-02-12

## 2023-02-12 NOTE — TELEPHONE ENCOUNTER
Patient is calling regarding cancelling an appointment      Date/Time:  02/13/2023  4:00pm    Patient was rescheduled: YES [] NO [x]    Patient requesting call back to reschedule: YES [] NO [x]    Pt mentioned she will call office back, to reschedule the appointment

## 2023-02-27 ENCOUNTER — OFFICE VISIT (OUTPATIENT)
Dept: FAMILY MEDICINE CLINIC | Facility: CLINIC | Age: 63
End: 2023-02-27

## 2023-02-27 ENCOUNTER — ANESTHESIA EVENT (OUTPATIENT)
Dept: ANESTHESIOLOGY | Facility: AMBULATORY SURGERY CENTER | Age: 63
End: 2023-02-27

## 2023-02-27 ENCOUNTER — ANESTHESIA (OUTPATIENT)
Dept: ANESTHESIOLOGY | Facility: AMBULATORY SURGERY CENTER | Age: 63
End: 2023-02-27

## 2023-02-27 VITALS
WEIGHT: 144.2 LBS | BODY MASS INDEX: 26.54 KG/M2 | DIASTOLIC BLOOD PRESSURE: 70 MMHG | HEIGHT: 62 IN | OXYGEN SATURATION: 99 % | HEART RATE: 54 BPM | TEMPERATURE: 97.6 F | SYSTOLIC BLOOD PRESSURE: 106 MMHG

## 2023-02-27 DIAGNOSIS — R53.83 FATIGUE, UNSPECIFIED TYPE: ICD-10-CM

## 2023-02-27 DIAGNOSIS — T50.B95A MYALGIA AFTER COVID-19 VACCINATION: ICD-10-CM

## 2023-02-27 DIAGNOSIS — M79.10 MYALGIA AFTER COVID-19 VACCINATION: ICD-10-CM

## 2023-02-27 DIAGNOSIS — I44.2 AIVR (ACCELERATED IDIOVENTRICULAR RHYTHM) (HCC): ICD-10-CM

## 2023-02-27 DIAGNOSIS — R07.81 RIB PAIN ON LEFT SIDE: Primary | ICD-10-CM

## 2023-02-27 NOTE — PROGRESS NOTES
Assessment/Plan:  62 yo female, in NAD, but back pain "all the time on the left side"  Motion makes it worse  Can't push grocery cart  Can do anything, but an hour later gets a deep, ache in the retrosternal area  Started before April when saw me  Before that, saw Dr Noy Mcdonald  On Pepcid  Will have EGD by Dr Candida Kay in the near future  No fevers, chills, lymphadenopathy, indigestion, nausea/vomiting or GI disturbance  She does note all of this started after her [place] shot  Did not seem to have any problem with that before she had the injection  Wonders if that might have something to do with it  Has had work-up over the past 2 years including sed rate and C-reactive protein all negative  She has not had CT scan of chest and she has not had rib x-rays  Pain is localized over the left upper ribs just medial to this scapula  When she does arm stretches and puts pressure with hands together and pulling hands apart she does have pain in the muscles over the upper thoracic spine which lends 1 to believe this is all muscular  To make certain it is not something else, will order the above-mentioned studies  I do believe they will all be normal, but we do not know until they are done  Had 2 cardiac ablations, Dr Teresa Jenkins in [place], for SVT  Now AVIR comes and goes  BMI Counseling: Body mass index is 26 37 kg/m²  The BMI is above normal  Nutrition recommendations include decreasing portion sizes, encouraging healthy choices of fruits and vegetables, decreasing fast food intake, consuming healthier snacks, limiting drinks that contain sugar, moderation in carbohydrate intake, increasing intake of lean protein and reducing intake of saturated and trans fat  Exercise recommendations include moderate physical activity 150 minutes/week and exercising 3-5 times per week  No pharmacotherapy was ordered  Rationale for BMI follow-up plan is due to patient being overweight or obese            1  Rib pain on left side  Comments:  Posterior upper back medial to the left scapula  Pain with pressure over that muscle  Orders:  -     XR ribs 2 vw left; Future; Expected date: 02/28/2023  -     CT chest wo contrast; Future; Expected date: 03/06/2023  -     CK (with reflex to MB); Future    2  Fatigue, unspecified type  Comments:  Fatigue has entered in and may be part of long-haul COVID  Orders:  -     Comprehensive metabolic panel; Future; Expected date: 03/06/2023  -     CBC and differential; Future; Expected date: 02/28/2023    3  Myalgia after COVID-19 vaccination  Comments:  I am beginning to think that long-hauler COVID symptoms play a role here  Orders:  -     Comprehensive metabolic panel; Future; Expected date: 03/06/2023  -     CBC and differential; Future; Expected date: 02/28/2023  -     Sedimentation rate, automated; Future; Expected date: 02/28/2023  -     C-reactive protein; Future; Expected date: 03/06/2023  -     CK (with reflex to MB); Future    4  AIVR (accelerated idioventricular rhythm) ([facility])  Comments: This is usually a slow and mild but nonetheless she is aware  She has had 2 ablations for SVT in past so is very conscious of this issue          Subjective:      Patient ID: Dnaiel Johnston is a 63 y o  female  HPI    The following portions of the patient's history were reviewed and updated as appropriate: She  has a past medical history of AIVR (accelerated idioventricular rhythm) ([address] ), Allergic, PVC (premature ventricular contraction), and SVT (supraventricular tachycardia) ([address] )    She   Patient Active Problem List    Diagnosis Date Noted   • Mid back pain 05/16/2022   • Lumbar spondylosis 05/16/2022   • Lumbar disc herniation 05/16/2022   • DDD (degenerative disc disease), lumbar 05/16/2022   • Myofascial pain 05/16/2022   • Chronic left-sided low back pain without sciatica 04/21/2022   • AIVR (accelerated idioventricular rhythm) ([address] ) 09/23/2021     She  has a past surgical history that includes Colonoscopy and Upper gastrointestinal endoscopy  Her family history includes No Known Problems in her father and mother  She  reports that she has quit smoking  Her smoking use included cigarettes  She has never used smokeless tobacco  She reports that she does not currently use alcohol  She reports that she does not use drugs  Current Outpatient Medications   Medication Sig Dispense Refill   • Cranberry 1000 MG CAPS Take 1 tablet by mouth daily     • famotidine (PEPCID) 20 mg tablet Take 1 tablet (20 mg total) by mouth 2 (two) times a day (Patient taking differently: Take 20 mg by mouth 2 (two) times a day Pt taking OD) 180 tablet 1   • Magnesium 100 MG CAPS Take 100 mg by mouth in the morning     • metoprolol succinate (TOPROL-XL) 25 mg 24 hr tablet Take 50 mg by mouth 2 (two) times a day Taking 100mg daily      • meloxicam (MOBIC) 15 mg tablet Take 1 tablet (15 mg total) by mouth daily (Patient not taking: Reported on 5/19/2022) 30 tablet 2   • tiZANidine (ZANAFLEX) 2 mg tablet Take 1 tablet (2 mg total) by mouth every 8 (eight) hours as needed for muscle spasms (Patient not taking: Reported on 2/27/2023) 30 tablet 1     No current facility-administered medications for this visit       Current Outpatient Medications on File Prior to Visit   Medication Sig   • Cranberry 1000 MG CAPS Take 1 tablet by mouth daily   • famotidine (PEPCID) 20 mg tablet Take 1 tablet (20 mg total) by mouth 2 (two) times a day (Patient taking differently: Take 20 mg by mouth 2 (two) times a day Pt taking OD)   • Magnesium 100 MG CAPS Take 100 mg by mouth in the morning   • metoprolol succinate (TOPROL-XL) 25 mg 24 hr tablet Take 50 mg by mouth 2 (two) times a day Taking 100mg daily    • meloxicam (MOBIC) 15 mg tablet Take 1 tablet (15 mg total) by mouth daily (Patient not taking: Reported on 5/19/2022)   • tiZANidine (ZANAFLEX) 2 mg tablet Take 1 tablet (2 mg total) by mouth every 8 (eight) hours as needed for muscle spasms (Patient not taking: Reported on 2/27/2023)     No current facility-administered medications on file prior to visit  She is allergic to penicillins and shellfish-derived products - food allergy       Review of Systems   Constitutional: Negative  HENT: Negative  Eyes: Negative  Respiratory: Negative  Cardiovascular: Negative  Saw Dr Edmund Cesar, but he retired in summer, 2022, EP, for arrhythmia and stable  Seeing EP MD in [facility] system   Gastrointestinal: Negative  Endocrine: Negative  Genitourinary: Negative  Musculoskeletal: Positive for arthralgias, back pain and myalgias  Neurological: Negative  Hematological: Negative  All other systems reviewed and are negative  Objective:      /70 (BP Location: Left arm, Patient Position: Sitting, Cuff Size: Standard)   Pulse (!) 54   Temp 97 6 °F (36 4 °C) (Temporal)   Ht 5' 2" (1 575 m)   Wt 65 4 kg (144 lb 3 2 oz)   SpO2 99%   BMI 26 37 kg/m²          Physical Exam  Constitutional:       Appearance: Normal appearance  She is well-developed  HENT:      Head: Normocephalic  Right Ear: External ear normal       Left Ear: External ear normal       Nose: Nose normal       Mouth/Throat:      Mouth: Mucous membranes are moist    Eyes:      General: No scleral icterus  Extraocular Movements: Extraocular movements intact  Conjunctiva/sclera: Conjunctivae normal       Pupils: Pupils are equal, round, and reactive to light  Neck:      Thyroid: No thyroid mass or thyromegaly  Vascular: No carotid bruit or JVD  Trachea: Trachea normal    Cardiovascular:      Rate and Rhythm: Normal rate and regular rhythm  Heart sounds: Normal heart sounds  No murmur heard  Pulmonary:      Effort: Pulmonary effort is normal       Breath sounds: Normal breath sounds  Abdominal:      General: Bowel sounds are normal  There is no distension  Palpations: Abdomen is soft  There is no mass        Hernia: No hernia is present  Musculoskeletal:         General: Normal range of motion  Cervical back: Full passive range of motion without pain, normal range of motion and neck supple  No edema  Comments: Pain upper back mid scapular region, has tenderness paraspinous region on the left  No rash to suggest herpes zoster  Lungs are clear  BUT, pt feels the "ache" or "pain" is "deep" -- in middle ot upper torso   Lymphadenopathy:      Cervical: No cervical adenopathy  Skin:     General: Skin is warm  Neurological:      General: No focal deficit present  Mental Status: She is alert and oriented to person, place, and time  Cranial Nerves: No cranial nerve deficit  Sensory: No sensory deficit  Motor: No abnormal muscle tone  Coordination: Coordination normal       Deep Tendon Reflexes: Reflexes normal    Psychiatric:         Mood and Affect: Mood normal          Behavior: Behavior normal          Thought Content: Thought content normal          Judgment: Judgment normal              This time was spent reviewing previous records, reviewing previous laboratory and other tests, taking history from patient, examination of patient, discussion of prognosis and treatment, ordering laboratory tests, ordering medications, and completion of the medical record

## 2023-03-02 ENCOUNTER — APPOINTMENT (OUTPATIENT)
Dept: LAB | Facility: CLINIC | Age: 63
End: 2023-03-02

## 2023-03-02 DIAGNOSIS — R07.81 RIB PAIN ON LEFT SIDE: ICD-10-CM

## 2023-03-02 DIAGNOSIS — R53.83 FATIGUE, UNSPECIFIED TYPE: ICD-10-CM

## 2023-03-02 DIAGNOSIS — M79.10 MYALGIA AFTER COVID-19 VACCINATION: ICD-10-CM

## 2023-03-02 DIAGNOSIS — T50.B95A MYALGIA AFTER COVID-19 VACCINATION: ICD-10-CM

## 2023-03-02 LAB
ALBUMIN SERPL BCP-MCNC: 4.2 G/DL (ref 3.5–5)
ALP SERPL-CCNC: 42 U/L (ref 34–104)
ALT SERPL W P-5'-P-CCNC: 12 U/L (ref 7–52)
ANION GAP SERPL CALCULATED.3IONS-SCNC: 10 MMOL/L (ref 4–13)
AST SERPL W P-5'-P-CCNC: 15 U/L (ref 13–39)
BASOPHILS # BLD AUTO: 0.04 THOUSANDS/ÂΜL (ref 0–0.1)
BASOPHILS NFR BLD AUTO: 0 % (ref 0–1)
BILIRUB SERPL-MCNC: 0.55 MG/DL (ref 0.2–1)
BUN SERPL-MCNC: 17 MG/DL (ref 5–25)
CALCIUM SERPL-MCNC: 9.4 MG/DL (ref 8.4–10.2)
CHLORIDE SERPL-SCNC: 101 MMOL/L (ref 96–108)
CK SERPL-CCNC: 60 U/L (ref 26–192)
CO2 SERPL-SCNC: 28 MMOL/L (ref 21–32)
CREAT SERPL-MCNC: 0.68 MG/DL (ref 0.6–1.3)
CRP SERPL QL: 3.7 MG/L
EOSINOPHIL # BLD AUTO: 0.14 THOUSAND/ÂΜL (ref 0–0.61)
EOSINOPHIL NFR BLD AUTO: 1 % (ref 0–6)
ERYTHROCYTE [DISTWIDTH] IN BLOOD BY AUTOMATED COUNT: 13.2 % (ref 11.6–15.1)
ERYTHROCYTE [SEDIMENTATION RATE] IN BLOOD: 13 MM/HOUR (ref 0–29)
GFR SERPL CREATININE-BSD FRML MDRD: 95 ML/MIN/1.73SQ M
GLUCOSE SERPL-MCNC: 83 MG/DL (ref 65–140)
HCT VFR BLD AUTO: 40.8 % (ref 34.8–46.1)
HGB BLD-MCNC: 13.1 G/DL (ref 11.5–15.4)
IMM GRANULOCYTES # BLD AUTO: 0.03 THOUSAND/UL (ref 0–0.2)
IMM GRANULOCYTES NFR BLD AUTO: 0 % (ref 0–2)
LYMPHOCYTES # BLD AUTO: 2.78 THOUSANDS/ÂΜL (ref 0.6–4.47)
LYMPHOCYTES NFR BLD AUTO: 29 % (ref 14–44)
MCH RBC QN AUTO: 29.6 PG (ref 26.8–34.3)
MCHC RBC AUTO-ENTMCNC: 32.1 G/DL (ref 31.4–37.4)
MCV RBC AUTO: 92 FL (ref 82–98)
MONOCYTES # BLD AUTO: 0.52 THOUSAND/ÂΜL (ref 0.17–1.22)
MONOCYTES NFR BLD AUTO: 5 % (ref 4–12)
NEUTROPHILS # BLD AUTO: 6.22 THOUSANDS/ÂΜL (ref 1.85–7.62)
NEUTS SEG NFR BLD AUTO: 65 % (ref 43–75)
NRBC BLD AUTO-RTO: 0 /100 WBCS
PLATELET # BLD AUTO: 255 THOUSANDS/UL (ref 149–390)
PMV BLD AUTO: 12 FL (ref 8.9–12.7)
POTASSIUM SERPL-SCNC: 4 MMOL/L (ref 3.5–5.3)
PROT SERPL-MCNC: 7.4 G/DL (ref 6.4–8.4)
RBC # BLD AUTO: 4.42 MILLION/UL (ref 3.81–5.12)
SODIUM SERPL-SCNC: 139 MMOL/L (ref 135–147)
WBC # BLD AUTO: 9.73 THOUSAND/UL (ref 4.31–10.16)

## 2023-03-04 ENCOUNTER — HOSPITAL ENCOUNTER (OUTPATIENT)
Dept: RADIOLOGY | Facility: HOSPITAL | Age: 63
Discharge: HOME | End: 2023-03-04

## 2023-03-04 DIAGNOSIS — R07.81 RIB PAIN ON LEFT SIDE: ICD-10-CM

## 2023-03-07 ENCOUNTER — HOSPITAL ENCOUNTER (OUTPATIENT)
Dept: CT IMAGING | Facility: HOSPITAL | Age: 63
Discharge: HOME | End: 2023-03-07

## 2023-03-07 DIAGNOSIS — R07.81 RIB PAIN ON LEFT SIDE: ICD-10-CM

## 2023-03-12 ENCOUNTER — ANESTHESIA EVENT (OUTPATIENT)
Dept: ANESTHESIOLOGY | Facility: AMBULATORY SURGERY CENTER | Age: 63
End: 2023-03-12

## 2023-03-12 ENCOUNTER — ANESTHESIA (OUTPATIENT)
Dept: ANESTHESIOLOGY | Facility: AMBULATORY SURGERY CENTER | Age: 63
End: 2023-03-12

## 2023-03-12 RX ORDER — SODIUM CHLORIDE, SODIUM LACTATE, POTASSIUM CHLORIDE, CALCIUM CHLORIDE 600; 310; 30; 20 MG/100ML; MG/100ML; MG/100ML; MG/100ML
125 INJECTION, SOLUTION INTRAVENOUS CONTINUOUS
Status: CANCELLED | OUTPATIENT
Start: 2023-03-12

## 2023-03-12 NOTE — ANESTHESIA PREPROCEDURE EVALUATION
Procedure:  PRE-OP ONLY    Relevant Problems   CARDIO   (+) AIVR (accelerated idioventricular rhythm) (HCC)      MUSCULOSKELETAL   (+) Chronic left-sided low back pain without sciatica   (+) DDD (degenerative disc disease), lumbar   (+) Lumbar spondylosis   (+) Mid back pain      NEURO/PSYCH   (+) Chronic left-sided low back pain without sciatica      Other   (+) Myofascial pain      S/p SVT ablations x 2             Anesthesia Plan  ASA Score- 2     Anesthesia Type- IV sedation with anesthesia with ASA Monitors  Additional Monitors:   Airway Plan:           Plan Factors-    Chart reviewed  Patient summary reviewed  Patient is not a current smoker  Patient did not smoke on day of surgery  Induction- intravenous      Postoperative Plan-     Informed Consent-

## 2023-03-13 ENCOUNTER — ANESTHESIA (OUTPATIENT)
Dept: GASTROENTEROLOGY | Facility: AMBULATORY SURGERY CENTER | Age: 63
End: 2023-03-13

## 2023-03-13 ENCOUNTER — ANESTHESIA EVENT (OUTPATIENT)
Dept: GASTROENTEROLOGY | Facility: AMBULATORY SURGERY CENTER | Age: 63
End: 2023-03-13

## 2023-03-13 ENCOUNTER — HOSPITAL ENCOUNTER (OUTPATIENT)
Dept: GASTROENTEROLOGY | Facility: AMBULATORY SURGERY CENTER | Age: 63
Discharge: HOME | End: 2023-03-13

## 2023-03-13 VITALS
HEIGHT: 62 IN | BODY MASS INDEX: 25.95 KG/M2 | TEMPERATURE: 96.9 F | RESPIRATION RATE: 18 BRPM | OXYGEN SATURATION: 98 % | WEIGHT: 141 LBS | SYSTOLIC BLOOD PRESSURE: 108 MMHG | DIASTOLIC BLOOD PRESSURE: 65 MMHG | HEART RATE: 74 BPM

## 2023-03-13 DIAGNOSIS — K21.9 GASTROESOPHAGEAL REFLUX DISEASE, UNSPECIFIED WHETHER ESOPHAGITIS PRESENT: ICD-10-CM

## 2023-03-13 RX ORDER — LIDOCAINE HYDROCHLORIDE 20 MG/ML
INJECTION, SOLUTION EPIDURAL; INFILTRATION; INTRACAUDAL; PERINEURAL AS NEEDED
Status: DISCONTINUED | OUTPATIENT
Start: 2023-03-13 | End: 2023-03-13

## 2023-03-13 RX ORDER — PROPOFOL 10 MG/ML
INJECTION, EMULSION INTRAVENOUS AS NEEDED
Status: DISCONTINUED | OUTPATIENT
Start: 2023-03-13 | End: 2023-03-13

## 2023-03-13 RX ORDER — PANTOPRAZOLE SODIUM 40 MG/1
40 TABLET, DELAYED RELEASE ORAL DAILY
Qty: 30 TABLET | Refills: 1 | Status: SHIPPED | OUTPATIENT
Start: 2023-03-13

## 2023-03-13 RX ORDER — SODIUM CHLORIDE 9 MG/ML
INJECTION, SOLUTION INTRAVENOUS CONTINUOUS PRN
Status: DISCONTINUED | OUTPATIENT
Start: 2023-03-13 | End: 2023-03-13

## 2023-03-13 RX ORDER — SODIUM CHLORIDE, SODIUM LACTATE, POTASSIUM CHLORIDE, CALCIUM CHLORIDE 600; 310; 30; 20 MG/100ML; MG/100ML; MG/100ML; MG/100ML
125 INJECTION, SOLUTION INTRAVENOUS CONTINUOUS
Status: DISCONTINUED | OUTPATIENT
Start: 2023-03-13 | End: 2023-03-17 | Stop reason: HOSPADM

## 2023-03-13 RX ADMIN — LIDOCAINE HYDROCHLORIDE 100 MG: 20 INJECTION, SOLUTION EPIDURAL; INFILTRATION; INTRACAUDAL; PERINEURAL at 12:56

## 2023-03-13 RX ADMIN — PROPOFOL 150 MG: 10 INJECTION, EMULSION INTRAVENOUS at 12:56

## 2023-03-13 RX ADMIN — SODIUM CHLORIDE: 9 INJECTION, SOLUTION INTRAVENOUS at 12:48

## 2023-03-13 NOTE — H&P
History and Physical -  [facility]  Maude Swift 63 y o  female MRN: [id number]                  HPI: Maude Swift is a 63y o  year old female who presents for heartburn and reflux symptoms      REVIEW OF SYSTEMS: Per the HPI, and otherwise unremarkable  Historical Information   Past Medical History:   Diagnosis Date   • AIVR (accelerated idioventricular rhythm) ([facility])    • Allergic    • Chronic pain disorder     back   • Colon polyp    • GERD (gastroesophageal reflux disease)    • Irregular heart beat    • PVC (premature ventricular contraction)    • SVT (supraventricular tachycardia) ([facility])      Past Surgical History:   Procedure Laterality Date   • CARDIAC ELECTROPHYSIOLOGY STUDY AND ABLATION     • COLONOSCOPY     • UPPER GASTROINTESTINAL ENDOSCOPY       Social History   Social History     Substance and Sexual Activity   Alcohol Use Not Currently   • Alcohol/week: 0 0 standard drinks     Social History     Substance and Sexual Activity   Drug Use Never     Social History     Tobacco Use   Smoking Status Former   • Packs/day: 0 00   • Years: 2 00   • Pack years: 0 00   • Types: Cigarettes   • Quit date: 36   • Years since quittin 2   Smokeless Tobacco Never     Family History   Problem Relation Age of Onset   • No Known Problems Mother    • No Known Problems Father    • Colon cancer Maternal Grandfather        Meds/Allergies     (Not in a hospital admission)      Allergies   Allergen Reactions   • Penicillins Hives   • Shellfish-Derived Products - Food Allergy Swelling       Objective     /55   Pulse 61   Temp (!) 96 9 °F (36 1 °C) (Temporal)   Resp 18   Ht 5' 2" (1 575 m)   Wt 64 kg (141 lb)   SpO2 97%   BMI 25 79 kg/m²       PHYSICAL EXAM    Gen: NAD  CV: RRR  CHEST: Clear  ABD: soft, NT/ND  EXT: no edema      ASSESSMENT/PLAN:  This is a 63y o  year old female here for EGD, and she is stable and optimized for her procedure

## 2023-03-13 NOTE — ANESTHESIA PREPROCEDURE EVALUATION
Procedure:  EGD    Relevant Problems   CARDIO   (+) AIVR (accelerated idioventricular rhythm) (HCC)      MUSCULOSKELETAL   (+) Chronic left-sided low back pain without sciatica   (+) DDD (degenerative disc disease), lumbar   (+) Lumbar spondylosis   (+) Mid back pain      NEURO/PSYCH   (+) Chronic left-sided low back pain without sciatica      S/p SVT ablations x 2  Concern with lying on her left side as AIVR may recur  Dr Ama Carver OK with performing procedure supine  Pt did take her metoprolol today        Physical Exam    Airway    Mallampati score: II  TM Distance: >3 FB  Neck ROM: full     Dental   No notable dental hx     Cardiovascular      Pulmonary      Other Findings        Anesthesia Plan  ASA Score- 2     Anesthesia Type- IV sedation with anesthesia with ASA Monitors  Additional Monitors:   Airway Plan:           Plan Factors-    Chart reviewed  Patient summary reviewed  Patient is not a current smoker  Patient did not smoke on day of surgery  Induction- intravenous  Postoperative Plan-     Informed Consent- Anesthetic plan and risks discussed with patient  I personally reviewed this patient with the CRNA  Discussed and agreed on the Anesthesia Plan with the CRNA  Mercedes Campo

## 2023-03-13 NOTE — ANESTHESIA POSTPROCEDURE EVALUATION
Post-Op Assessment Note    CV Status:  Stable    Pain management: adequate     Mental Status:  Awake   Hydration Status:  Stable   PONV Controlled:  Controlled   Airway Patency:  Patent      Post Op Vitals Reviewed: Yes      Staff: Anesthesiologist, CRNA         No notable events documented      BP   107/58   Temp     Pulse  68   Resp   18   SpO2   99

## 2023-03-13 NOTE — DISCHARGE INSTRUCTIONS
Upper Endoscopy   WHAT YOU NEED TO KNOW:   An upper endoscopy is also called an upper gastrointestinal (GI) endoscopy, or an esophagogastroduodenoscopy (EGD)  It is a procedure to examine the inside of your esophagus, stomach, and duodenum (first part of the small intestine) with a scope  You may feel bloated, gassy, or have some abdominal discomfort after your procedure  Your throat may be sore for 24 to 36 hours  You may burp or pass gas from air that is still inside your body  DISCHARGE INSTRUCTIONS:   Seek care immediately if:   You have sudden, severe abdominal pain  You have problems swallowing  You have a large amount of black, sticky bowel movements or blood in your bowel movements  You have sudden trouble breathing  You feel weak, lightheaded, or faint or your heart beats faster than normal for you  Contact your healthcare provider if:   You have a fever and chills  You have nausea or are vomiting  Your abdomen is bloated or feels full and hard  You have abdominal pain  You have black, sticky bowel movements or blood in your bowel movements  You have not had a bowel movement for 3 days after your procedure  You have rash or hives  You have questions or concerns about your procedure  Activity:   Do not lift, strain, or run for 24 hours after your procedure  Rest after your procedure  You have been given medicine to relax you  Do not drive or make important decisions until the day after your procedure  Return to your normal activity as directed  Relieve gas and discomfort from bloating by lying on your right side with a heating pad on your abdomen  You may need to take short walks to help the gas move out  Eat small meals until bloating is relieved  Follow up with your healthcare provider as directed: Write down your questions so you remember to ask them during your visits       If you take a “blood thinner”, please review the specific instructions from your endoscopist about when you should resume it  These can be found in the “Recommendation” and “Your Medication list” sections of this After Visit Summary  '

## 2024-10-09 ENCOUNTER — OFFICE VISIT (OUTPATIENT)
Dept: URGENT CARE | Facility: CLINIC | Age: 64
End: 2024-10-09
Payer: COMMERCIAL

## 2024-10-09 VITALS
TEMPERATURE: 97.8 F | SYSTOLIC BLOOD PRESSURE: 136 MMHG | HEIGHT: 62 IN | BODY MASS INDEX: 24.84 KG/M2 | HEART RATE: 86 BPM | WEIGHT: 135 LBS | DIASTOLIC BLOOD PRESSURE: 86 MMHG | RESPIRATION RATE: 18 BRPM | OXYGEN SATURATION: 98 %

## 2024-10-09 DIAGNOSIS — M54.9 UPPER BACK PAIN: Primary | ICD-10-CM

## 2024-10-09 PROCEDURE — 99213 OFFICE O/P EST LOW 20 MIN: CPT | Performed by: PHYSICIAN ASSISTANT

## 2024-10-09 RX ORDER — NAPROXEN 500 MG/1
500 TABLET ORAL 2 TIMES DAILY WITH MEALS
Qty: 20 TABLET | Refills: 0 | Status: SHIPPED | OUTPATIENT
Start: 2024-10-09 | End: 2024-10-15 | Stop reason: SDUPTHER

## 2024-10-09 RX ORDER — LIDOCAINE 50 MG/G
1 PATCH TOPICAL DAILY
Qty: 30 PATCH | Refills: 0 | Status: SHIPPED | OUTPATIENT
Start: 2024-10-09

## 2024-10-09 RX ORDER — TIZANIDINE HYDROCHLORIDE 2 MG/1
2 CAPSULE, GELATIN COATED ORAL 3 TIMES DAILY
Qty: 15 CAPSULE | Refills: 0 | Status: SHIPPED | OUTPATIENT
Start: 2024-10-09 | End: 2024-10-15

## 2024-10-09 NOTE — PROGRESS NOTES
[facility]        NAME: Krystle Kincaid is a 64 y.o. female  : 1960    MRN: [id number]  DATE: 2024  TIME: 10:39 AM    Assessment and Plan   Upper back pain [M54.9]  1. Upper back pain  TiZANidine (ZANAFLEX) 2 MG capsule    naproxen (Naprosyn) 500 mg tablet    lidocaine (Lidoderm) 5 %        Meds as discussed     Patient Instructions   There are no Patient Instructions on file for this visit.      Follow up with PCP in 3-5 days.  Proceed to  ER if symptoms worsen.    Chief Complaint     Chief Complaint   Patient presents with    Back Pain     Pt has chronic back pain. She tried a new therapy last Thursday and is now inflamed.          History of Present Illness       ALLAN is a 63 yo F presenting to the urgent care for muscle spasms. She states that she has a history of a herniated disc in both her lumbar and thoracic spine with associated intermittent pain. She has been undergoing sound wave therapy at her chiropractor and the spasms started after her 4th treatment and worsened after the 5th. The pain continued to worsen over the weekend and she tried tylenol and  tizanidine to no relief. She denies any urinary retention, saddle numbness, sob, or chest pain.         Review of Systems   Review of Systems   Constitutional:  Negative for activity change, appetite change, chills, fatigue and fever.   HENT:  Negative for congestion, ear pain, rhinorrhea, sinus pressure, sinus pain and sore throat.    Eyes:  Negative for pain and visual disturbance.   Respiratory:  Negative for cough, chest tightness and shortness of breath.    Cardiovascular:  Negative for chest pain and palpitations.   Gastrointestinal:  Negative for abdominal pain, diarrhea, nausea and vomiting.   Genitourinary:  Negative for dysuria and hematuria.   Musculoskeletal:  Positive for back pain. Negative for arthralgias and myalgias.   Skin:  Negative for color change, pallor and rash.   Neurological:  Negative for  seizures, syncope and headaches.   All other systems reviewed and are negative.        Current Medications       Current Outpatient Medications:     Cranberry 1000 MG CAPS, Take 1 tablet by mouth daily, Disp: , Rfl:     lidocaine (Lidoderm) 5 %, Apply 1 patch topically over 12 hours daily Remove & Discard patch within 12 hours or as directed by MD, Disp: 30 patch, Rfl: 0    Magnesium 100 MG CAPS, Take 100 mg by mouth in the morning, Disp: , Rfl:     metoprolol succinate (TOPROL-XL) 25 mg 24 hr tablet, Take 50 mg by mouth 2 (two) times a day Taking 100mg daily , Disp: , Rfl:     naproxen (Naprosyn) 500 mg tablet, Take 1 tablet (500 mg total) by mouth 2 (two) times a day with meals, Disp: 20 tablet, Rfl: 0    TiZANidine (ZANAFLEX) 2 MG capsule, Take 1 capsule (2 mg total) by mouth 3 (three) times a day for 5 days, Disp: 15 capsule, Rfl: 0    meloxicam (MOBIC) 15 mg tablet, Take 1 tablet (15 mg total) by mouth daily (Patient not taking: Reported on 5/19/2022), Disp: 30 tablet, Rfl: 2    pantoprazole (PROTONIX) 40 mg tablet, Take 1 tablet (40 mg total) by mouth daily (Patient not taking: Reported on 10/9/2024), Disp: 30 tablet, Rfl: 1    tiZANidine (ZANAFLEX) 2 mg tablet, Take 1 tablet (2 mg total) by mouth every 8 (eight) hours as needed for muscle spasms (Patient not taking: Reported on 2/27/2023), Disp: 30 tablet, Rfl: 1    Current Allergies     Allergies as of 10/09/2024 - Reviewed 10/09/2024   Allergen Reaction Noted    Penicillins Hives 10/30/2019    Shellfish-derived products - food allergy Swelling 04/01/2021            The following portions of the patient's history were reviewed and updated as appropriate: allergies, current medications, past family history, past medical history, past social history, past surgical history and problem list.     Past Medical History:   Diagnosis Date    AIVR (accelerated idioventricular rhythm) (HCC)     Allergic     Chronic pain disorder     back    Colon polyp     GERD  "(gastroesophageal reflux disease)     Irregular heart beat     PVC (premature ventricular contraction)     SVT (supraventricular tachycardia) (HCC)        Past Surgical History:   Procedure Laterality Date    CARDIAC ELECTROPHYSIOLOGY STUDY AND ABLATION      COLONOSCOPY      UPPER GASTROINTESTINAL ENDOSCOPY         Family History   Problem Relation Age of Onset    No Known Problems Mother     No Known Problems Father     Colon cancer Maternal Grandfather          Medications have been verified.        Objective   /86   Pulse 86   Temp 97.8 °F (36.6 °C)   Resp 18   Ht 5' 2\" (1.575 m)   Wt 61.2 kg (135 lb)   SpO2 98%   BMI 24.69 kg/m²        Physical Exam     Physical Exam  Vitals and nursing note reviewed.   Constitutional:       General: She is not in acute distress.     Appearance: Normal appearance. She is normal weight. She is not ill-appearing, toxic-appearing or diaphoretic.   HENT:      Head: Normocephalic and atraumatic.   Cardiovascular:      Rate and Rhythm: Normal rate and regular rhythm.      Heart sounds: Normal heart sounds. No murmur heard.     No friction rub. No gallop.   Pulmonary:      Effort: Pulmonary effort is normal. No respiratory distress.      Breath sounds: Normal breath sounds. No stridor. No wheezing, rhonchi or rales.   Chest:      Chest wall: No tenderness.   Musculoskeletal:         General: Tenderness (left sided upper back TTP) present. No swelling. Normal range of motion.   Skin:     General: Skin is warm and dry.      Capillary Refill: Capillary refill takes less than 2 seconds.   Neurological:      Mental Status: She is alert.                   "

## 2024-10-15 ENCOUNTER — OFFICE VISIT (OUTPATIENT)
Dept: FAMILY MEDICINE CLINIC | Facility: CLINIC | Age: 64
End: 2024-10-15
Payer: COMMERCIAL

## 2024-10-15 VITALS
DIASTOLIC BLOOD PRESSURE: 76 MMHG | WEIGHT: 138 LBS | TEMPERATURE: 97.8 F | OXYGEN SATURATION: 96 % | HEART RATE: 85 BPM | HEIGHT: 62 IN | SYSTOLIC BLOOD PRESSURE: 128 MMHG | BODY MASS INDEX: 25.4 KG/M2 | RESPIRATION RATE: 17 BRPM

## 2024-10-15 DIAGNOSIS — Z13.29 SCREENING FOR THYROID DISORDER: ICD-10-CM

## 2024-10-15 DIAGNOSIS — M54.9 MID BACK PAIN: Primary | ICD-10-CM

## 2024-10-15 DIAGNOSIS — I44.2 AIVR (ACCELERATED IDIOVENTRICULAR RHYTHM) (HCC): ICD-10-CM

## 2024-10-15 DIAGNOSIS — Z79.899 DRUG THERAPY: ICD-10-CM

## 2024-10-15 DIAGNOSIS — M79.18 MYOFASCIAL PAIN: ICD-10-CM

## 2024-10-15 DIAGNOSIS — M54.9 UPPER BACK PAIN: ICD-10-CM

## 2024-10-15 DIAGNOSIS — Z13.1 SCREENING FOR DIABETES MELLITUS: ICD-10-CM

## 2024-10-15 DIAGNOSIS — R00.2 PALPITATIONS: ICD-10-CM

## 2024-10-15 DIAGNOSIS — Z13.0 SCREENING, DEFICIENCY ANEMIA, IRON: ICD-10-CM

## 2024-10-15 PROCEDURE — 99213 OFFICE O/P EST LOW 20 MIN: CPT | Performed by: NURSE PRACTITIONER

## 2024-10-15 RX ORDER — METOPROLOL TARTRATE 25 MG/1
25 TABLET, FILM COATED ORAL EVERY 12 HOURS SCHEDULED
COMMUNITY
Start: 2024-10-14

## 2024-10-15 RX ORDER — HERBAL COMPLEX NO.174 450 MG
1 CAPSULE ORAL DAILY
COMMUNITY

## 2024-10-15 RX ORDER — NAPROXEN 500 MG/1
500 TABLET ORAL 2 TIMES DAILY WITH MEALS
Qty: 60 TABLET | Refills: 1 | Status: SHIPPED | OUTPATIENT
Start: 2024-10-15

## 2024-10-15 NOTE — ASSESSMENT & PLAN NOTE
The case discussed with patient using patient centered shared decision making.The patient was counseled regarding instructions for management,-- risk factor reductions,-- prognosis,-- impressions,-- risks and benefits of treatment options,-- importance of compliance with treatment. I have reviewed the instructions with the patient, answering all questions to her satisfaction.    Krystle and I had lengthy discussion related to her current situation.  We discussed the safety aspects of few treatment options in relation to cardiac side effects.  We need to be careful not to potentiate cardiac arrhythmia or worsen hypertension.  I hesitate to prescribe steroids as we cannot stop them abruptly or we may see a rebound of her pain.  I cannot explain the relationship between the acoustic therapy and the increase of her perceived ectopy.  She will continue with naproxen.  Trial of topical lidocaine.  Monitor closely.  Check blood work to rule out organic component of symptomology.  Monitor closely.    Orders:    CBC and differential; Future    Comprehensive metabolic panel    Vitamin B12; Future    Sedimentation rate, automated; Future    C-reactive protein; Future

## 2024-10-15 NOTE — PROGRESS NOTES
Ambulatory Visit  Name: Krystle Kincaid      : 1960      MRN: [id number]  Encounter Provider: ISABELLA Martin  Encounter Date: 10/15/2024   Encounter department: [facility] [place]    Assessment & Plan  Mid back pain  The case discussed with patient using patient centered shared decision making.The patient was counseled regarding instructions for management,-- risk factor reductions,-- prognosis,-- impressions,-- risks and benefits of treatment options,-- importance of compliance with treatment. I have reviewed the instructions with the patient, answering all questions to her satisfaction.    Krystle and I had lengthy discussion related to her current situation.  We discussed the safety aspects of few treatment options in relation to cardiac side effects.  We need to be careful not to potentiate cardiac arrhythmia or worsen hypertension.  I hesitate to prescribe steroids as we cannot stop them abruptly or we may see a rebound of her pain.  I cannot explain the relationship between the acoustic therapy and the increase of her perceived ectopy.  She will continue with naproxen.  Trial of topical lidocaine.  Monitor closely.  Check blood work to rule out organic component of symptomology.  Monitor closely.    Orders:    CBC and differential; Future    Comprehensive metabolic panel    Vitamin B12; Future    Sedimentation rate, automated; Future    C-reactive protein; Future    Myofascial pain    Orders:    CBC and differential; Future    Comprehensive metabolic panel    Vitamin B12; Future    Sedimentation rate, automated; Future    C-reactive protein; Future    AIVR (accelerated idioventricular rhythm) (HCC)  Managed by cardiology  Orders:    CBC and differential; Future    Comprehensive metabolic panel    TSH, 3rd generation with Free T4 reflex    Magnesium    Sedimentation rate, automated; Future       History of Present Illness     64-year-old pleasant female presents for same-day  "visit  She has longstanding history of multilevel back pain which has been treated by multiple treatment modalities  At present is receiving acoustic therapy for ongoing back pain.   She reports an acceleration of her mid back pain.  In fact she is associated increase in her cardiac ectopy since starting treatment  She has difficulty finding relief of discomfort at present-muscle relaxers not especially effective.  Naproxen somewhat effective  She needs refills  She has history of EP ablation x 2  She saw her cardiologist yesterday    She is presenting today to discuss pain relief until the side effects of the acoustic therapy wears off          Review of Systems   Constitutional:  Negative for fever.   HENT:  Negative for trouble swallowing.    Respiratory:  Negative for cough and shortness of breath.    Cardiovascular:  Positive for palpitations. Negative for chest pain.   Gastrointestinal:  Negative for abdominal pain and blood in stool.   Musculoskeletal:  Positive for arthralgias and back pain.   Skin:  Negative for rash.   Neurological:  Negative for dizziness and syncope.           Objective     /76 (BP Location: Left arm, Patient Position: Sitting, Cuff Size: Standard)   Pulse 85   Temp 97.8 °F (36.6 °C) (Temporal)   Resp 17   Ht 5' 2\" (1.575 m)   Wt 62.6 kg (138 lb)   SpO2 96%   BMI 25.24 kg/m²     Physical Exam  Vitals and nursing note reviewed.   Constitutional:       General: She is not in acute distress.     Appearance: Normal appearance.   Skin:     General: Skin is warm and dry.   Neurological:      General: No focal deficit present.      Mental Status: She is alert.   Psychiatric:         Mood and Affect: Mood normal.         Behavior: Behavior normal.         "

## 2024-10-15 NOTE — ASSESSMENT & PLAN NOTE
Managed by cardiology  Orders:    CBC and differential; Future    Comprehensive metabolic panel    TSH, 3rd generation with Free T4 reflex    Magnesium    Sedimentation rate, automated; Future

## 2024-10-15 NOTE — ASSESSMENT & PLAN NOTE
Orders:    CBC and differential; Future    Comprehensive metabolic panel    Vitamin B12; Future    Sedimentation rate, automated; Future    C-reactive protein; Future

## 2024-10-16 ENCOUNTER — APPOINTMENT (OUTPATIENT)
Dept: LAB | Facility: HOSPITAL | Age: 64
End: 2024-10-16
Payer: COMMERCIAL

## 2024-10-16 DIAGNOSIS — M79.18 MYOFASCIAL PAIN: ICD-10-CM

## 2024-10-16 DIAGNOSIS — M54.9 MID BACK PAIN: ICD-10-CM

## 2024-10-16 DIAGNOSIS — I44.2 AIVR (ACCELERATED IDIOVENTRICULAR RHYTHM) (HCC): ICD-10-CM

## 2024-10-16 DIAGNOSIS — Z13.0 SCREENING, DEFICIENCY ANEMIA, IRON: ICD-10-CM

## 2024-10-16 DIAGNOSIS — Z79.899 DRUG THERAPY: ICD-10-CM

## 2024-10-16 DIAGNOSIS — R00.2 PALPITATIONS: ICD-10-CM

## 2024-10-16 DIAGNOSIS — M54.9 UPPER BACK PAIN: ICD-10-CM

## 2024-10-16 LAB
ALBUMIN SERPL BCG-MCNC: 4.3 G/DL (ref 3.5–5)
ALP SERPL-CCNC: 37 U/L (ref 34–104)
ALT SERPL W P-5'-P-CCNC: 12 U/L (ref 7–52)
ANION GAP SERPL CALCULATED.3IONS-SCNC: 5 MMOL/L (ref 4–13)
AST SERPL W P-5'-P-CCNC: 12 U/L (ref 13–39)
BASOPHILS # BLD AUTO: 0.04 THOUSANDS/ΜL (ref 0–0.1)
BASOPHILS NFR BLD AUTO: 1 % (ref 0–1)
BILIRUB SERPL-MCNC: 0.35 MG/DL (ref 0.2–1)
BUN SERPL-MCNC: 20 MG/DL (ref 5–25)
CALCIUM SERPL-MCNC: 9.3 MG/DL (ref 8.4–10.2)
CHLORIDE SERPL-SCNC: 104 MMOL/L (ref 96–108)
CO2 SERPL-SCNC: 30 MMOL/L (ref 21–32)
CREAT SERPL-MCNC: 0.74 MG/DL (ref 0.6–1.3)
CRP SERPL QL: 7.1 MG/L
EOSINOPHIL # BLD AUTO: 0.22 THOUSAND/ΜL (ref 0–0.61)
EOSINOPHIL NFR BLD AUTO: 3 % (ref 0–6)
ERYTHROCYTE [DISTWIDTH] IN BLOOD BY AUTOMATED COUNT: 13.2 % (ref 11.6–15.1)
ERYTHROCYTE [SEDIMENTATION RATE] IN BLOOD: 13 MM/HOUR (ref 0–29)
GFR SERPL CREATININE-BSD FRML MDRD: 87 ML/MIN/1.73SQ M
GLUCOSE P FAST SERPL-MCNC: 91 MG/DL (ref 65–99)
HCT VFR BLD AUTO: 41.9 % (ref 34.8–46.1)
HGB BLD-MCNC: 13.6 G/DL (ref 11.5–15.4)
IMM GRANULOCYTES # BLD AUTO: 0.03 THOUSAND/UL (ref 0–0.2)
IMM GRANULOCYTES NFR BLD AUTO: 0 % (ref 0–2)
LYMPHOCYTES # BLD AUTO: 2.37 THOUSANDS/ΜL (ref 0.6–4.47)
LYMPHOCYTES NFR BLD AUTO: 29 % (ref 14–44)
MAGNESIUM SERPL-MCNC: 2.2 MG/DL (ref 1.9–2.7)
MCH RBC QN AUTO: 30 PG (ref 26.8–34.3)
MCHC RBC AUTO-ENTMCNC: 32.5 G/DL (ref 31.4–37.4)
MCV RBC AUTO: 92 FL (ref 82–98)
MONOCYTES # BLD AUTO: 0.49 THOUSAND/ΜL (ref 0.17–1.22)
MONOCYTES NFR BLD AUTO: 6 % (ref 4–12)
NEUTROPHILS # BLD AUTO: 5.05 THOUSANDS/ΜL (ref 1.85–7.62)
NEUTS SEG NFR BLD AUTO: 61 % (ref 43–75)
NRBC BLD AUTO-RTO: 0 /100 WBCS
PLATELET # BLD AUTO: 255 THOUSANDS/UL (ref 149–390)
PMV BLD AUTO: 11.8 FL (ref 8.9–12.7)
POTASSIUM SERPL-SCNC: 4.4 MMOL/L (ref 3.5–5.3)
PROT SERPL-MCNC: 7.6 G/DL (ref 6.4–8.4)
RBC # BLD AUTO: 4.54 MILLION/UL (ref 3.81–5.12)
SODIUM SERPL-SCNC: 139 MMOL/L (ref 135–147)
TSH SERPL DL<=0.05 MIU/L-ACNC: 2.11 UIU/ML (ref 0.45–4.5)
VIT B12 SERPL-MCNC: 254 PG/ML (ref 180–914)
WBC # BLD AUTO: 8.2 THOUSAND/UL (ref 4.31–10.16)

## 2024-10-16 PROCEDURE — 85025 COMPLETE CBC W/AUTO DIFF WBC: CPT

## 2024-10-16 PROCEDURE — 85652 RBC SED RATE AUTOMATED: CPT

## 2024-10-16 PROCEDURE — 80053 COMPREHEN METABOLIC PANEL: CPT | Performed by: NURSE PRACTITIONER

## 2024-10-16 PROCEDURE — 36415 COLL VENOUS BLD VENIPUNCTURE: CPT | Performed by: NURSE PRACTITIONER

## 2024-10-16 PROCEDURE — 83735 ASSAY OF MAGNESIUM: CPT | Performed by: NURSE PRACTITIONER

## 2024-10-16 PROCEDURE — 84443 ASSAY THYROID STIM HORMONE: CPT | Performed by: NURSE PRACTITIONER

## 2024-10-16 PROCEDURE — 86140 C-REACTIVE PROTEIN: CPT

## 2024-10-16 PROCEDURE — 82607 VITAMIN B-12: CPT

## 2024-10-17 ENCOUNTER — TELEPHONE (OUTPATIENT)
Age: 64
End: 2024-10-17

## 2024-10-17 NOTE — TELEPHONE ENCOUNTER
Caller:Krystle     Doctor/Office: Lee Ann     CB#: [phone]      What needs to be faxed: OV/Procedure Notes     ATTN to: Dr Thompson [place]     Fax#: [phone]      Documents were successfully e-faxed

## 2024-12-04 DIAGNOSIS — M54.9 UPPER BACK PAIN: ICD-10-CM

## 2024-12-05 RX ORDER — NAPROXEN 500 MG/1
500 TABLET ORAL 2 TIMES DAILY WITH MEALS
Qty: 60 TABLET | Refills: 1 | Status: SHIPPED | OUTPATIENT
Start: 2024-12-05

## 2024-12-11 ENCOUNTER — TELEPHONE (OUTPATIENT)
Dept: FAMILY MEDICINE CLINIC | Facility: CLINIC | Age: 64
End: 2024-12-11

## 2024-12-11 DIAGNOSIS — R79.82 ELEVATED C-REACTIVE PROTEIN (CRP): ICD-10-CM

## 2024-12-11 DIAGNOSIS — M79.18 MYOFASCIAL PAIN: Primary | ICD-10-CM

## 2024-12-13 ENCOUNTER — RESULTS FOLLOW-UP (OUTPATIENT)
Dept: FAMILY MEDICINE CLINIC | Facility: CLINIC | Age: 64
End: 2024-12-13

## 2024-12-13 ENCOUNTER — APPOINTMENT (OUTPATIENT)
Dept: LAB | Facility: HOSPITAL | Age: 64
End: 2024-12-13
Payer: COMMERCIAL

## 2024-12-13 DIAGNOSIS — R79.82 ELEVATED C-REACTIVE PROTEIN (CRP): ICD-10-CM

## 2024-12-13 DIAGNOSIS — M79.18 MYOFASCIAL PAIN: ICD-10-CM

## 2024-12-13 LAB — CRP SERPL QL: 1.7 MG/L

## 2024-12-13 PROCEDURE — 86140 C-REACTIVE PROTEIN: CPT

## 2024-12-13 PROCEDURE — 36415 COLL VENOUS BLD VENIPUNCTURE: CPT

## 2025-03-18 ENCOUNTER — TELEPHONE (OUTPATIENT)
Dept: FAMILY MEDICINE CLINIC | Facility: CLINIC | Age: 65
End: 2025-03-18

## 2025-03-27 ENCOUNTER — OFFICE VISIT (OUTPATIENT)
Dept: FAMILY MEDICINE CLINIC | Facility: CLINIC | Age: 65
End: 2025-03-27
Payer: COMMERCIAL

## 2025-03-27 VITALS
HEIGHT: 62 IN | HEART RATE: 68 BPM | SYSTOLIC BLOOD PRESSURE: 122 MMHG | TEMPERATURE: 97.2 F | OXYGEN SATURATION: 97 % | WEIGHT: 147.2 LBS | BODY MASS INDEX: 27.09 KG/M2 | DIASTOLIC BLOOD PRESSURE: 74 MMHG

## 2025-03-27 DIAGNOSIS — D50.9 IRON DEFICIENCY ANEMIA, UNSPECIFIED IRON DEFICIENCY ANEMIA TYPE: ICD-10-CM

## 2025-03-27 DIAGNOSIS — Z12.31 ENCOUNTER FOR SCREENING MAMMOGRAM FOR BREAST CANCER: ICD-10-CM

## 2025-03-27 DIAGNOSIS — S84.92XA NEURAPRAXIA OF LEFT LOWER EXTREMITY, INITIAL ENCOUNTER: Primary | ICD-10-CM

## 2025-03-27 DIAGNOSIS — E78.2 MIXED HYPERLIPIDEMIA: ICD-10-CM

## 2025-03-27 DIAGNOSIS — R73.9 ELEVATED BLOOD SUGAR: ICD-10-CM

## 2025-03-27 DIAGNOSIS — I44.2 AIVR (ACCELERATED IDIOVENTRICULAR RHYTHM) (HCC): ICD-10-CM

## 2025-03-27 DIAGNOSIS — R53.82 CHRONIC FATIGUE: ICD-10-CM

## 2025-03-27 DIAGNOSIS — Z13.6 SCREENING FOR CARDIOVASCULAR, RESPIRATORY, AND GENITOURINARY DISEASES: ICD-10-CM

## 2025-03-27 DIAGNOSIS — E55.9 VITAMIN D DEFICIENCY: ICD-10-CM

## 2025-03-27 DIAGNOSIS — R79.82 ELEVATED C-REACTIVE PROTEIN (CRP): ICD-10-CM

## 2025-03-27 DIAGNOSIS — Z13.89 SCREENING FOR CARDIOVASCULAR, RESPIRATORY, AND GENITOURINARY DISEASES: ICD-10-CM

## 2025-03-27 DIAGNOSIS — Z13.83 SCREENING FOR CARDIOVASCULAR, RESPIRATORY, AND GENITOURINARY DISEASES: ICD-10-CM

## 2025-03-27 DIAGNOSIS — M54.9 UPPER BACK PAIN: ICD-10-CM

## 2025-03-27 DIAGNOSIS — Z11.4 SCREENING FOR HIV (HUMAN IMMUNODEFICIENCY VIRUS): ICD-10-CM

## 2025-03-27 DIAGNOSIS — Z13.89 SCREENING FOR HEMATURIA OR PROTEINURIA: ICD-10-CM

## 2025-03-27 LAB — SL AMB POCT HEMOGLOBIN AIC: 5.5 (ref ?–6.5)

## 2025-03-27 PROCEDURE — 83036 HEMOGLOBIN GLYCOSYLATED A1C: CPT | Performed by: FAMILY MEDICINE

## 2025-03-27 PROCEDURE — 99215 OFFICE O/P EST HI 40 MIN: CPT | Performed by: FAMILY MEDICINE

## 2025-03-27 RX ORDER — FLECAINIDE ACETATE 50 MG/1
50 TABLET ORAL 2 TIMES DAILY
COMMUNITY
Start: 2024-12-16

## 2025-03-27 NOTE — PROGRESS NOTES
Name: Krystle Kincaid      : 1960      MRN: [id number]  Encounter Provider: LUDIN Paniagua DO  Encounter Date: 3/27/2025   Encounter department: [facility]    Assessment & Plan  Neurapraxia of left lower extremity, initial encounter    Orders:    CBC; Future    UA w Reflex to Microscopic w Reflex to Culture    Comprehensive metabolic panel; Future    Lipid panel; Future    TSH, 3rd generation; Future    Vitamin D 25 hydroxy; Future    Albumin / creatinine urine ratio    MANUEL Screen w/Reflex Cascade; Future    Sjogren's Antibodies; Future    Sedimentation rate, automated; Future    C-reactive protein; Future    Encounter for screening mammogram for breast cancer    Orders:    Mammo screening bilateral w 3d and cad; Future    Elevated blood sugar    Orders:    POCT hemoglobin A1c    Comprehensive metabolic panel; Future    AIVR (accelerated idioventricular rhythm) (HCC)         Chronic fatigue    Orders:    CBC; Future    TSH, 3rd generation; Future    Screening for hematuria or proteinuria    Orders:    UA w Reflex to Microscopic w Reflex to Culture    Albumin / creatinine urine ratio    Screening for cardiovascular, respiratory, and genitourinary diseases    Orders:    Comprehensive metabolic panel; Future    Mixed hyperlipidemia    Orders:    Lipid panel; Future    Vitamin D deficiency    Orders:    Vitamin D 25 hydroxy; Future    Elevated C-reactive protein (CRP)    Orders:    CBC; Future    UA w Reflex to Microscopic w Reflex to Culture    Comprehensive metabolic panel; Future    Lipid panel; Future    TSH, 3rd generation; Future    Vitamin D 25 hydroxy; Future    Albumin / creatinine urine ratio    MANUEL Screen w/Reflex Cascade; Future    Sjogren's Antibodies; Future    Sedimentation rate, automated; Future    C-reactive protein; Future    Protein electrophoresis, serum; Future    Creatine Kinase, Total; Future    Ferritin; Future    Upper back pain    Orders:    CBC; Future    UA w  Reflex to Microscopic w Reflex to Culture    Comprehensive metabolic panel; Future    Lipid panel; Future    TSH, 3rd generation; Future    Vitamin D 25 hydroxy; Future    Albumin / creatinine urine ratio    MANUEL Screen w/Reflex Cascade; Future    Sjogren's Antibodies; Future    Sedimentation rate, automated; Future    C-reactive protein; Future    Protein electrophoresis, serum; Future    Creatine Kinase, Total; Future    Ferritin; Future    Iron deficiency anemia, unspecified iron deficiency anemia type    Orders:    CBC; Future    UA w Reflex to Microscopic w Reflex to Culture    Comprehensive metabolic panel; Future    Ferritin; Future    Screening for HIV (human immunodeficiency virus)    Orders:    HIV 1/2 AG/AB w Reflex [facility] for 2 yr old and above; Future      Assessment & Plan  1. Elevated blood glucose level: Normal.  - A1c level is 5.5. Elevated blood glucose level of 124 mg/dL observed in the emergency room is likely due to stress or other transient factors. No immediate concerns regarding blood sugar levels.    2. Arrhythmia: Chronic.  - Experiences palpitations and arrhythmias, possibly related to an inflammatory response. Currently on metoprolol succinate 25 mg twice a day and flecainide.  - Continue current medication regimen.  - Monitor symptoms.  - Follow up with electrophysiologist, Dr. [place], next week.    3. Neuropraxia: Chronic.  - Symptoms suggest neuropraxia, possibly due to an inflammatory response affecting the nerves.  - Consider gabapentin or Lyrica for nerve pain if symptoms persist.  - Order comprehensive set of laboratory tests including vitamin D, liver function tests, kidney function tests, MANUEL screen with reflex cascade, Sjogren's antibodies, sed rate, and C-reactive protein.    Follow-up  - Follow up with electrophysiologist, Dr. Betancourt, next week.    PROCEDURE  The patient has received shockwave therapy from a chiropractor, which initially provided relief but later resulted in  severe pain and chest discomfort. She has also undergone an epidural injection in her thoracic region.    Depression Screening and Follow-up Plan: Patient was screened for depression during today's encounter. They screened negative with a PHQ-2 score of 0.          History of Present Illness     History of Present Illness  The patient is a 64-year-old female who presents for evaluation of left rib pain under the scapula, elevated blood glucose level, and arrhythmia.    Left Rib Pain  - Persistent back pain managed by avoiding overexertion  - Received shockwave therapy from a chiropractor, initially provided relief but later resulted in severe pain and chest discomfort  - Underwent an epidural injection in her thoracic region  - Gabapentin or Lyrica suggested as potential treatments, but uncertain about their efficacy    Arrhythmia  - Experiences palpitations, believed to be linked to an inflammatory response  - Describes a three-phase process: constant pain, aggravated pain that improves with ice application, and arrhythmias if the pain becomes unmanageable  - CRP levels consistently elevated during episodes, but sed rate remains normal  - Scheduled to see Dr. Betancourt next week  - Informed that arrhythmias are not cardiac in origin and may be due to low potassium levels  - History of SVT and bigeminy, but current rhythm is regular with a rate in the 70s  - Able to exercise without difficulty  - Currently on metoprolol succinate 25 mg twice daily, with an additional dose as needed for palpitations  - Also on flecainide, prescribed due to constant PVCs and PACs    Elevated Blood Glucose Level  - Blood work done in the emergency room showed blood sugar was 124  - Believes elevated blood sugar might be due to stress from being in [place] [facility] [place] the whole time  - Never had an issue with blood sugar before    Supplemental information: None    SOCIAL HISTORY  She does not smoke, drink alcohol, or use drugs.    MEDICATIONS  Current:  "Metoprolol succinate 25 mg twice daily, flecainide    IMMUNIZATIONS  She received the COVID-19 vaccine and had a significant inflammatory response with 3 months of swollen lymph nodes.     Review of Systems   Constitutional:  Negative for activity change, appetite change, chills, diaphoresis, fatigue and fever.   HENT:  Negative for congestion, ear discharge, ear pain, facial swelling, nosebleeds, sore throat, tinnitus, trouble swallowing and voice change.    Eyes:  Negative for photophobia, pain, discharge, redness, itching and visual disturbance.   Respiratory:  Negative for apnea, cough, choking, chest tightness and shortness of breath.    Cardiovascular:  Negative for chest pain, palpitations and leg swelling.   Gastrointestinal:  Negative for abdominal distention, abdominal pain, blood in stool, constipation, diarrhea, nausea and vomiting.   Endocrine: Negative for cold intolerance, heat intolerance, polydipsia, polyphagia and polyuria.   Genitourinary:  Negative for decreased urine volume, difficulty urinating, dysuria, enuresis, frequency, hematuria, pelvic pain, urgency and vaginal bleeding.   Musculoskeletal:  Positive for arthralgias and neck stiffness. Negative for back pain, gait problem, joint swelling and neck pain.   Skin:  Negative for color change, pallor and rash.   Allergic/Immunologic: Negative for immunocompromised state.   Neurological:  Negative for dizziness, seizures, facial asymmetry, light-headedness, numbness and headaches.   Hematological:  Negative for adenopathy.   Psychiatric/Behavioral:  Negative for agitation, behavioral problems, confusion, decreased concentration, dysphoric mood and hallucinations.      Objective   /74 (BP Location: Left arm, Patient Position: Sitting, Cuff Size: Standard)   Pulse 68   Temp (!) 97.2 °F (36.2 °C) (Temporal)   Ht 5' 2\" (1.575 m)   Wt 66.8 kg (147 lb 3.2 oz)   SpO2 97%   BMI 26.92 kg/m²     Physical Exam  Heart sounds normal.  Physical " Exam  Vitals and nursing note reviewed.   Constitutional:       General: She is not in acute distress.     Appearance: Normal appearance. She is not ill-appearing, toxic-appearing or diaphoretic.   HENT:      Head: Normocephalic.      Nose: Nose normal.      Mouth/Throat:      Mouth: Mucous membranes are moist.      Pharynx: No oropharyngeal exudate or posterior oropharyngeal erythema.   Eyes:      Extraocular Movements: Extraocular movements intact.      Pupils: Pupils are equal, round, and reactive to light.   Cardiovascular:      Rate and Rhythm: Normal rate and regular rhythm.      Pulses: Normal pulses.      Heart sounds: Normal heart sounds.      No gallop.      Comments: Has history of AI VR--accelerated idioventricular rhythm which has been studied and monitored by cardiology.  Patient currently on flecainide 50 mg twice daily per [place] cardiologist and metoprolol 25 mg 2 tabs twice daily--a peculiar dose but she can always take an extra if needed.  Pulmonary:      Effort: No respiratory distress.      Breath sounds: Normal breath sounds. No wheezing, rhonchi or rales.   Abdominal:      General: Abdomen is flat.   Musculoskeletal:         General: No tenderness, deformity or signs of injury.      Cervical back: Normal range of motion and neck supple.      Right lower leg: No edema.      Left lower leg: No edema.   Skin:     General: Skin is warm and dry.      Coloration: Skin is not pale.      Findings: No erythema, lesion or rash.   Neurological:      General: No focal deficit present.      Mental Status: She is alert and oriented to person, place, and time. Mental status is at baseline.   Psychiatric:         Mood and Affect: Mood normal.         Behavior: Behavior normal.         Thought Content: Thought content normal.         Judgment: Judgment normal.       Administrative Statements   I have spent a total time of 40 minutes in caring for this patient on the day of the visit/encounter including  Diagnostic results, Prognosis, Risks and benefits of tx options, Instructions for management, Patient and family education, Importance of tx compliance, Risk factor reductions, Impressions, Counseling / Coordination of care, Documenting in the medical record, Reviewing/placing orders in the medical record (including tests, medications, and/or procedures), and Obtaining or reviewing history  .

## 2025-03-28 ENCOUNTER — APPOINTMENT (OUTPATIENT)
Dept: LAB | Facility: HOSPITAL | Age: 65
End: 2025-03-28
Payer: COMMERCIAL

## 2025-03-28 DIAGNOSIS — R73.9 ELEVATED BLOOD SUGAR: ICD-10-CM

## 2025-03-28 DIAGNOSIS — Z11.4 SCREENING FOR HIV (HUMAN IMMUNODEFICIENCY VIRUS): ICD-10-CM

## 2025-03-28 DIAGNOSIS — E78.2 MIXED HYPERLIPIDEMIA: ICD-10-CM

## 2025-03-28 DIAGNOSIS — Z13.6 SCREENING FOR CARDIOVASCULAR, RESPIRATORY, AND GENITOURINARY DISEASES: ICD-10-CM

## 2025-03-28 DIAGNOSIS — E55.9 VITAMIN D DEFICIENCY: ICD-10-CM

## 2025-03-28 DIAGNOSIS — S84.92XA NEURAPRAXIA OF LEFT LOWER EXTREMITY, INITIAL ENCOUNTER: ICD-10-CM

## 2025-03-28 DIAGNOSIS — R53.82 CHRONIC FATIGUE: ICD-10-CM

## 2025-03-28 DIAGNOSIS — Z13.89 SCREENING FOR CARDIOVASCULAR, RESPIRATORY, AND GENITOURINARY DISEASES: ICD-10-CM

## 2025-03-28 DIAGNOSIS — M54.9 UPPER BACK PAIN: ICD-10-CM

## 2025-03-28 DIAGNOSIS — D50.9 IRON DEFICIENCY ANEMIA, UNSPECIFIED IRON DEFICIENCY ANEMIA TYPE: ICD-10-CM

## 2025-03-28 DIAGNOSIS — R79.82 ELEVATED C-REACTIVE PROTEIN (CRP): ICD-10-CM

## 2025-03-28 DIAGNOSIS — Z13.83 SCREENING FOR CARDIOVASCULAR, RESPIRATORY, AND GENITOURINARY DISEASES: ICD-10-CM

## 2025-03-28 LAB
25(OH)D3 SERPL-MCNC: 24.2 NG/ML (ref 30–100)
ALBUMIN SERPL BCG-MCNC: 4.2 G/DL (ref 3.5–5)
ALP SERPL-CCNC: 36 U/L (ref 34–104)
ALT SERPL W P-5'-P-CCNC: 11 U/L (ref 7–52)
ANION GAP SERPL CALCULATED.3IONS-SCNC: 8 MMOL/L (ref 4–13)
AST SERPL W P-5'-P-CCNC: 13 U/L (ref 13–39)
BILIRUB SERPL-MCNC: 0.37 MG/DL (ref 0.2–1)
BILIRUB UR QL STRIP: NEGATIVE
BUN SERPL-MCNC: 19 MG/DL (ref 5–25)
CALCIUM SERPL-MCNC: 8.9 MG/DL (ref 8.4–10.2)
CHLORIDE SERPL-SCNC: 103 MMOL/L (ref 96–108)
CHOLEST SERPL-MCNC: 229 MG/DL (ref ?–200)
CK SERPL-CCNC: 53 U/L (ref 26–192)
CLARITY UR: CLEAR
CO2 SERPL-SCNC: 27 MMOL/L (ref 21–32)
COLOR UR: YELLOW
CREAT SERPL-MCNC: 0.77 MG/DL (ref 0.6–1.3)
CREAT UR-MCNC: 92.7 MG/DL
CRP SERPL QL: 1.3 MG/L
ERYTHROCYTE [DISTWIDTH] IN BLOOD BY AUTOMATED COUNT: 12.2 % (ref 11.6–15.1)
ERYTHROCYTE [SEDIMENTATION RATE] IN BLOOD: 12 MM/HOUR (ref 0–29)
FERRITIN SERPL-MCNC: 109 NG/ML (ref 11–307)
GFR SERPL CREATININE-BSD FRML MDRD: 83 ML/MIN/1.73SQ M
GLUCOSE P FAST SERPL-MCNC: 91 MG/DL (ref 65–99)
GLUCOSE UR STRIP-MCNC: NEGATIVE MG/DL
HCT VFR BLD AUTO: 40.4 % (ref 34.8–46.1)
HDLC SERPL-MCNC: 74 MG/DL
HGB BLD-MCNC: 13 G/DL (ref 11.5–15.4)
HGB UR QL STRIP.AUTO: NEGATIVE
KETONES UR STRIP-MCNC: NEGATIVE MG/DL
LDLC SERPL CALC-MCNC: 139 MG/DL (ref 0–100)
LEUKOCYTE ESTERASE UR QL STRIP: NEGATIVE
MCH RBC QN AUTO: 30 PG (ref 26.8–34.3)
MCHC RBC AUTO-ENTMCNC: 32.2 G/DL (ref 31.4–37.4)
MCV RBC AUTO: 93 FL (ref 82–98)
MICROALBUMIN UR-MCNC: 10 MG/L
MICROALBUMIN/CREAT 24H UR: 11 MG/G CREATININE (ref 0–30)
NITRITE UR QL STRIP: NEGATIVE
NONHDLC SERPL-MCNC: 155 MG/DL
PH UR STRIP.AUTO: 6.5 [PH]
PLATELET # BLD AUTO: 241 THOUSANDS/UL (ref 149–390)
PMV BLD AUTO: 11.1 FL (ref 8.9–12.7)
POTASSIUM SERPL-SCNC: 4.3 MMOL/L (ref 3.5–5.3)
PROT SERPL-MCNC: 6.9 G/DL (ref 6.4–8.4)
PROT UR STRIP-MCNC: NEGATIVE MG/DL
RBC # BLD AUTO: 4.33 MILLION/UL (ref 3.81–5.12)
SODIUM SERPL-SCNC: 138 MMOL/L (ref 135–147)
SP GR UR STRIP.AUTO: 1.02 (ref 1–1.03)
TRIGL SERPL-MCNC: 78 MG/DL (ref ?–150)
TSH SERPL DL<=0.05 MIU/L-ACNC: 1.64 UIU/ML (ref 0.45–4.5)
UROBILINOGEN UR QL STRIP.AUTO: 0.2 E.U./DL
WBC # BLD AUTO: 7.75 THOUSAND/UL (ref 4.31–10.16)

## 2025-03-28 PROCEDURE — 86140 C-REACTIVE PROTEIN: CPT

## 2025-03-28 PROCEDURE — 82043 UR ALBUMIN QUANTITATIVE: CPT | Performed by: FAMILY MEDICINE

## 2025-03-28 PROCEDURE — 87389 HIV-1 AG W/HIV-1&-2 AB AG IA: CPT

## 2025-03-28 PROCEDURE — 81003 URINALYSIS AUTO W/O SCOPE: CPT | Performed by: FAMILY MEDICINE

## 2025-03-28 PROCEDURE — 36415 COLL VENOUS BLD VENIPUNCTURE: CPT

## 2025-03-28 PROCEDURE — 84165 PROTEIN E-PHORESIS SERUM: CPT

## 2025-03-28 PROCEDURE — 84443 ASSAY THYROID STIM HORMONE: CPT

## 2025-03-28 PROCEDURE — 82570 ASSAY OF URINE CREATININE: CPT | Performed by: FAMILY MEDICINE

## 2025-03-28 PROCEDURE — 82728 ASSAY OF FERRITIN: CPT

## 2025-03-28 PROCEDURE — 82550 ASSAY OF CK (CPK): CPT

## 2025-03-28 PROCEDURE — 85027 COMPLETE CBC AUTOMATED: CPT

## 2025-03-28 PROCEDURE — 86334 IMMUNOFIX E-PHORESIS SERUM: CPT

## 2025-03-28 PROCEDURE — 80053 COMPREHEN METABOLIC PANEL: CPT

## 2025-03-28 PROCEDURE — 80061 LIPID PANEL: CPT

## 2025-03-28 PROCEDURE — 85652 RBC SED RATE AUTOMATED: CPT

## 2025-03-28 PROCEDURE — 86225 DNA ANTIBODY NATIVE: CPT

## 2025-03-28 PROCEDURE — 86235 NUCLEAR ANTIGEN ANTIBODY: CPT

## 2025-03-28 PROCEDURE — 82306 VITAMIN D 25 HYDROXY: CPT

## 2025-03-28 PROCEDURE — 86038 ANTINUCLEAR ANTIBODIES: CPT

## 2025-03-29 ENCOUNTER — RESULTS FOLLOW-UP (OUTPATIENT)
Dept: FAMILY MEDICINE CLINIC | Facility: CLINIC | Age: 65
End: 2025-03-29

## 2025-03-29 LAB — HIV 1+2 AB+HIV1 P24 AG SERPL QL IA: NORMAL

## 2025-03-31 ENCOUNTER — TELEPHONE (OUTPATIENT)
Dept: GASTROENTEROLOGY | Facility: CLINIC | Age: 65
End: 2025-03-31

## 2025-03-31 LAB
ALBUMIN SERPL ELPH-MCNC: 4.07 G/DL (ref 3.2–5.1)
ALBUMIN SERPL ELPH-MCNC: 59.8 % (ref 48–70)
ALPHA1 GLOB SERPL ELPH-MCNC: 0.26 G/DL (ref 0.15–0.47)
ALPHA1 GLOB SERPL ELPH-MCNC: 3.8 % (ref 1.8–7)
ALPHA2 GLOB SERPL ELPH-MCNC: 0.61 G/DL (ref 0.42–1.04)
ALPHA2 GLOB SERPL ELPH-MCNC: 8.9 % (ref 5.9–14.9)
BETA GLOB ABNORMAL SERPL ELPH-MCNC: 0.42 G/DL (ref 0.31–0.57)
BETA1 GLOB SERPL ELPH-MCNC: 6.2 % (ref 4.7–7.7)
BETA2 GLOB SERPL ELPH-MCNC: 6.9 % (ref 3.1–7.9)
BETA2+GAMMA GLOB SERPL ELPH-MCNC: 0.47 G/DL (ref 0.2–0.58)
GAMMA GLOB ABNORMAL SERPL ELPH-MCNC: 0.98 G/DL (ref 0.4–1.66)
GAMMA GLOB SERPL ELPH-MCNC: 14.4 % (ref 6.9–22.3)
IGG/ALB SER: 1.49 {RATIO} (ref 1.1–1.8)
INTERPRETATION UR IFE-IMP: NORMAL
PROT PATTERN SERPL ELPH-IMP: NORMAL
PROT SERPL-MCNC: 6.8 G/DL (ref 6.4–8.2)

## 2025-03-31 PROCEDURE — 86334 IMMUNOFIX E-PHORESIS SERUM: CPT | Performed by: PATHOLOGY

## 2025-03-31 PROCEDURE — 84165 PROTEIN E-PHORESIS SERUM: CPT | Performed by: PATHOLOGY

## 2025-03-31 NOTE — TELEPHONE ENCOUNTER
Pt is due for an EGD for GERD (Dr Villagomez pt). Called and spoke to pt whom informed she needs to think about it since Dr Villagomez left.  Recall letter mailed as reminder.

## 2025-04-01 LAB
DSDNA IGG SERPL IA-ACNC: 1.3 IU/ML (ref ?–15)
ENA SS-A AB SER IA-ACNC: <0.5 U/ML (ref ?–10)
ENA SS-B IGG SER IA-ACNC: <0.6 U/ML (ref ?–10)
NUCLEAR IGG SER IA-RTO: 0.3 RATIO (ref ?–1)

## 2025-04-07 ENCOUNTER — OFFICE VISIT (OUTPATIENT)
Dept: OBGYN CLINIC | Facility: CLINIC | Age: 65
End: 2025-04-07
Payer: COMMERCIAL

## 2025-04-07 ENCOUNTER — TELEPHONE (OUTPATIENT)
Age: 65
End: 2025-04-07

## 2025-04-07 VITALS — WEIGHT: 145 LBS | BODY MASS INDEX: 26.68 KG/M2 | HEIGHT: 62 IN

## 2025-04-07 DIAGNOSIS — M47.816 LUMBAR SPONDYLOSIS: ICD-10-CM

## 2025-04-07 DIAGNOSIS — M54.50 CHRONIC LEFT-SIDED LOW BACK PAIN WITHOUT SCIATICA: ICD-10-CM

## 2025-04-07 DIAGNOSIS — G89.29 CHRONIC LEFT-SIDED LOW BACK PAIN WITHOUT SCIATICA: ICD-10-CM

## 2025-04-07 DIAGNOSIS — M54.9 MID BACK PAIN: Primary | ICD-10-CM

## 2025-04-07 PROCEDURE — 99204 OFFICE O/P NEW MOD 45 MIN: CPT | Performed by: PHYSICAL MEDICINE & REHABILITATION

## 2025-04-07 RX ORDER — PREDNISONE 20 MG/1
20 TABLET ORAL
Qty: 5 TABLET | Refills: 0 | Status: SHIPPED | OUTPATIENT
Start: 2025-04-07 | End: 2025-04-12

## 2025-04-07 NOTE — TELEPHONE ENCOUNTER
Pt saw [facility] 11/2024. I advised pt that records would need to be reviewed before an appt could be scheduled. Pt stated that she will call back.

## 2025-04-07 NOTE — PROGRESS NOTES
1. Mid back pain  Ambulatory referral to Spine & Pain Management    predniSONE 20 mg tablet      2. Lumbar spondylosis        3. Chronic left-sided low back pain without sciatica          Orders Placed This Encounter   Procedures    Ambulatory referral to Spine & Pain Management        Impression:  Left thoracic pain that is multifactorial and predominantly due to myofascial spasticity and possibly thoracic radiculitis versus facet mediated pain.  There are no concerning neurological deficits.    Krystle has had extensive treatment over the past few years including spine and pain for epidural and trigger point injections.  We discussed seeing spine and pain to see if she is a candidate either for trigger point steroid injection versus other intervention.  In the interim, I have prescribed her a low-dose steroid burst.  She is currently seeing cardiology for AIVR.    No follow-ups on file.    Patient is in agreement with the above plan.      Imaging Studies (I personally reviewed images in PACS and report if it was available):  MRI thoracic spine from 10/2024 reviewed: Mild spondylotic changes.    HPI:  Krystle Kincaid is a 64 y.o. female  who presents for evaluation of   Chief Complaint   Patient presents with    Middle Back - Pain     Mri done 10/29/24     Lower Back - Pain     MRI done 2020       Onset/Mechanism: Chronic pain for many years.  There was no injury.  Location: Left thoracic spine.  Radiation: Left lumbar but it is not bad.  Quality: Pain.  Provocative: Reaching like pushing a cart.  Severity: Getting worse.  Associated Symptoms: Feels like a squeezing sensation.  Treatment so far: Seen by pain management and has had epidural injections and trigger point injections.  She has also had muscle relaxants and anti-inflammatories.    No red flag symptoms including fever/chills, unintentional weight change, bowel/bladder incontinence, scissoring gait, personal/family history of cancer, pain worse at night,  "intravenous drug abuse or trauma.      Following history reviewed and updated:  Past Medical History:   Diagnosis Date    AIVR (accelerated idioventricular rhythm) ([facility])     Allergic     Chronic pain disorder     back    Colon polyp     GERD (gastroesophageal reflux disease)     Irregular heart beat     PVC (premature ventricular contraction)     SVT (supraventricular tachycardia) (HCC)      Past Surgical History:   Procedure Laterality Date    CARDIAC ELECTROPHYSIOLOGY STUDY AND ABLATION      COLONOSCOPY      UPPER GASTROINTESTINAL ENDOSCOPY       Social History   Social History     Substance and Sexual Activity   Alcohol Use Not Currently    Alcohol/week: 0.0 standard drinks of alcohol     Social History     Substance and Sexual Activity   Drug Use Never     Social History     Tobacco Use   Smoking Status Former    Current packs/day: 0.00    Types: Cigarettes    Quit date:     Years since quittin.2   Smokeless Tobacco Never     Family History   Problem Relation Age of Onset    No Known Problems Mother     No Known Problems Father     Colon cancer Maternal Grandfather      Allergies   Allergen Reactions    Penicillins Hives    Shellfish-Derived Products - Food Allergy Swelling        Constitutional:  Ht 5' 2\" (1.575 m)   Wt 65.8 kg (145 lb)   BMI 26.52 kg/m²    General: NAD.   Eyes: Anicteric sclerae.  Neck: Supple.  Lungs: Unlabored breathing.  Cardiovascular: No lower extremity edema.  Skin: Intact without erythema.  Neurologic: Sensation intact to light touch.  Psychiatric: Mood and affect are appropriate.    Orthopedic Examination  Inspection: No obvious deformities, lesions or rashes.  ROM: Within normal limits.  Palpation: Tender to palpation with hypertonicity along the left thoracic paraspinals and rhomboid region. There are no step offs.  Neuro: Bilateral extremity strength is normal and symmetric. No muscle atrophy or abnormal tone.  Bilateral extremity muscle stretch reflexes are physiologic " and symmetric .  No myelopathic signs.   Sensation to light touch is intact throughout.  Gait is normal.    Procedures

## 2025-04-09 ENCOUNTER — APPOINTMENT (OUTPATIENT)
Dept: LAB | Facility: HOSPITAL | Age: 65
End: 2025-04-09
Payer: COMMERCIAL

## 2025-04-09 DIAGNOSIS — E78.00 PURE HYPERCHOLESTEROLEMIA: ICD-10-CM

## 2025-04-09 PROCEDURE — 83695 ASSAY OF LIPOPROTEIN(A): CPT

## 2025-04-09 PROCEDURE — 36415 COLL VENOUS BLD VENIPUNCTURE: CPT

## 2025-04-10 LAB — LPA SERPL-SCNC: 60.8 NMOL/L

## 2025-04-14 ENCOUNTER — HOSPITAL ENCOUNTER (OUTPATIENT)
Dept: CT IMAGING | Facility: HOSPITAL | Age: 65
Discharge: HOME | End: 2025-04-14
Attending: INTERNAL MEDICINE
Payer: COMMERCIAL

## 2025-04-14 DIAGNOSIS — E78.00 PURE HYPERCHOLESTEROLEMIA: ICD-10-CM

## 2025-04-14 PROCEDURE — 75571 CT HRT W/O DYE W/CA TEST: CPT

## 2025-05-29 ENCOUNTER — CONSULT (OUTPATIENT)
Dept: PAIN MEDICINE | Facility: CLINIC | Age: 65
End: 2025-05-29
Payer: COMMERCIAL

## 2025-05-29 VITALS
SYSTOLIC BLOOD PRESSURE: 99 MMHG | HEIGHT: 62 IN | HEART RATE: 60 BPM | WEIGHT: 144 LBS | BODY MASS INDEX: 26.5 KG/M2 | DIASTOLIC BLOOD PRESSURE: 59 MMHG

## 2025-05-29 DIAGNOSIS — M79.18 MYOFASCIAL PAIN: ICD-10-CM

## 2025-05-29 DIAGNOSIS — M51.24 PROTRUSION OF THORACIC INTERVERTEBRAL DISC: ICD-10-CM

## 2025-05-29 DIAGNOSIS — M54.9 MID BACK PAIN: Primary | ICD-10-CM

## 2025-05-29 DIAGNOSIS — M47.894 THORACIC FACET JOINT SYNDROME: ICD-10-CM

## 2025-05-29 PROCEDURE — 99204 OFFICE O/P NEW MOD 45 MIN: CPT | Performed by: PHYSICAL MEDICINE & REHABILITATION

## 2025-05-29 NOTE — PROGRESS NOTES
Assessment  1. Mid back pain    2. Myofascial pain    3. Protrusion of thoracic intervertebral disc    4. Thoracic facet joint syndrome        Plan  Ms. Gomez is a pleasant 65-year-old female with significant past medical history of AIVR presents to [facility] for initial evaluation regarding chronic mid and low back pain with intermittent rating symptoms into the left side of her ribs and subscapular.  During today's evaluation she is demonstrating pain that is likely multifactorial in nature with the majority of her symptoms do appear to be generating from the thoracic facets and myofascial musculature.  Most recent MRI thoracic spine does reveal multilevel thoracic disc protrusions most notable at T6-T7, T10-T11, T11-T12 that may be contributing to her symptoms.  At this time  The patient has been experiencing moderate to severe axial spine pain that is causing functional deficit.  The pain has been present for at least 3 months and is not improving with conservative care.  Currently the patient is not experiencing any radicular features nor neurogenic claudication.  Nonfacet pathology has been ruled out on clinical evaluation.    We will schedule the patient for left-sided T6, T7, T8 medial branch nerve blocks with intention of moving forward towards radiofrequency ablation if there is an appropriate diagnostic response.  The initial blocks will be performed with 0.25% bupivacaine and if an appropriate response is obtained upon review of the patient's pain diary, a confirmatory block will be scheduled with 0.75% bupivacaine.     In the office today, we reviewed the nature of facet joint pathology in depth using a spine model. We discussed the approach we would use for the injections and provided literature for home review. The patient understands the risks associated with the procedure including bleeding, infection, tissue injury, and allergic reaction and provided verbal informed  consent in the office today.      My impressions and treatment recommendations were discussed in detail with the patient who verbalized understanding and had no further questions.  Discharge instructions were provided. I personally saw and examined the patient and I agree with the above discussed plan of care.    Orders Placed This Encounter   Procedures    FL spine and pain procedure     Standing Status:   Future     Expected Date:   5/29/2025     Expiration Date:   5/29/2029     Reason for Exam::   Left sided T6, T7, T8 MBB #1     Is an anticoagulant hold needed?:   No     No orders of the defined types were placed in this encounter.      History of Present Illness    Krystle Kincaid is a 65 y.o. female presents to [facility] for initial evaluation regarding 4+ years duration of isolated mid back pain primarily left-sided with intermittent radiating symptoms around the ribs.  Denies any significant inciting event or recent trauma.  Today reports moderate to severe pain rated 5-7 out of 10 and interfering with activities.  Pain is constant 100% of the time described as burning, throbbing, dull aching pain.  Denies any significant upper extremity weakness or dropping objects.  Does not use any durable medical equipment for ambulation.  Symptoms are worse with bending, sitting.  Reports NSAIDs have provided no relief as well as TENS unit and chiropractic manipulation.  Presents today for initial evaluation.    I have personally reviewed and/or updated the patient's past medical history, past surgical history, family history, social history, current medications, allergies, and vital signs today.     Review of Systems   Constitutional:  Negative for fever and unexpected weight change.   HENT:  Negative for trouble swallowing.    Eyes:  Negative for visual disturbance.   Respiratory:  Negative for shortness of breath and wheezing.    Cardiovascular:  Negative for chest pain and palpitations.    Gastrointestinal:  Negative for constipation, diarrhea, nausea and vomiting.   Endocrine: Negative for cold intolerance, heat intolerance and polydipsia.   Genitourinary:  Negative for difficulty urinating and frequency.   Musculoskeletal:  Positive for back pain (thoracic spine). Negative for arthralgias, gait problem, joint swelling and myalgias.   Skin:  Negative for rash.   Neurological:  Negative for dizziness, seizures, syncope, weakness and headaches.   Hematological:  Does not bruise/bleed easily.   Psychiatric/Behavioral:  Negative for dysphoric mood.    All other systems reviewed and are negative.      Problem List[1]    Past Medical History[2]    Past Surgical History[3]    Family History[4]    Social History     Occupational History    Not on file   Tobacco Use    Smoking status: Former     Current packs/day: 0.00     Types: Cigarettes     Quit date:      Years since quittin.4    Smokeless tobacco: Never   Vaping Use    Vaping status: Never Used   Substance and Sexual Activity    Alcohol use: Not Currently     Alcohol/week: 0.0 standard drinks of alcohol    Drug use: Never    Sexual activity: Not on file       Current Outpatient Medications on File Prior to Visit   Medication Sig    Cranberry 1000 MG CAPS Take 1 tablet by mouth in the morning.    Cranberry Fruit 465 MG CAPS Take 1 tablet by mouth in the morning.    flecainide (TAMBOCOR) 50 mg tablet Take 50 mg by mouth in the morning and 50 mg in the evening.    Magnesium 100 MG CAPS Take 100 mg by mouth in the morning    metoprolol succinate (TOPROL-XL) 25 mg 24 hr tablet Take 50 mg by mouth in the morning and 50 mg before bedtime. Taking 100mg daily.    naproxen (NAPROSYN) 500 mg tablet TAKE 1 TABLET BY MOUTH TWO TIMES DAILY WITH MEALS    lidocaine (Lidoderm) 5 % Apply 1 patch topically over 12 hours daily Remove & Discard patch within 12 hours or as directed by MD (Patient not taking: Reported on 2025)    metoprolol tartrate (LOPRESSOR)  "25 mg tablet Take 25 mg by mouth every 12 (twelve) hours (Patient taking differently: Take 25 mg by mouth as needed)    pantoprazole (PROTONIX) 40 mg tablet Take 1 tablet (40 mg total) by mouth daily (Patient not taking: Reported on 10/9/2024)    TiZANidine (ZANAFLEX) 2 MG capsule Take 1 capsule (2 mg total) by mouth 3 (three) times a day for 5 days (Patient not taking: Reported on 10/15/2024)     No current facility-administered medications on file prior to visit.       Allergies[5]    Physical Exam    BP 99/59 (BP Location: Left arm, Patient Position: Sitting, Cuff Size: Standard)   Pulse 60   Ht 5' 2\" (1.575 m)   Wt 65.3 kg (144 lb)   BMI 26.34 kg/m²     Constitutional: normal, well developed, well nourished, alert, in no distress and non-toxic and no overt pain behavior.  Eyes: anicteric  HEENT: grossly intact  Neck: supple, symmetric, trachea midline and no masses   Pulmonary:even and unlabored  Cardiovascular:No edema or pitting edema present  Skin:Normal without rashes or lesions and well hydrated  Psychiatric:Mood and affect appropriate  Neurologic:Cranial Nerves II-XII grossly intact  Musculoskeletal:normal gait, tenderness to palpation left-sided thoracic paraspinals, decreased active and passive range of motion thoracic extension, MMT 5 out of 5 bilateral lower extremities, sensation grossly tact to light touch, DTRs within normal limits, positive pain to palpation left-sided thoracic facets with axial loading and rotational forces to the left    Imaging         [1]   Patient Active Problem List  Diagnosis    AIVR (accelerated idioventricular rhythm) (Formerly [facility])    Chronic left-sided low back pain without sciatica    Mid back pain    Lumbar spondylosis    Lumbar disc herniation    DDD (degenerative disc disease), lumbar    Myofascial pain   [2]   Past Medical History:  Diagnosis Date    AIVR (accelerated idioventricular rhythm) (Formerly [facility])     Allergic     Chronic pain disorder     back    Colon polyp     GERD " (gastroesophageal reflux disease)     Irregular heart beat     PVC (premature ventricular contraction)     SVT (supraventricular tachycardia) (HCC)    [3]   Past Surgical History:  Procedure Laterality Date    CARDIAC ELECTROPHYSIOLOGY STUDY AND ABLATION      COLONOSCOPY      UPPER GASTROINTESTINAL ENDOSCOPY     [4]   Family History  Problem Relation Name Age of Onset    No Known Problems Mother      No Known Problems Father      Colon cancer Maternal Grandfather     [5]   Allergies  Allergen Reactions    Penicillins Hives    Shellfish-Derived Products - Food Allergy Swelling

## 2025-06-05 DIAGNOSIS — M54.9 UPPER BACK PAIN: ICD-10-CM

## 2025-06-05 RX ORDER — NAPROXEN 500 MG/1
500 TABLET ORAL 2 TIMES DAILY WITH MEALS
Qty: 60 TABLET | Refills: 1 | Status: SHIPPED | OUTPATIENT
Start: 2025-06-05

## 2025-06-24 ENCOUNTER — HOSPITAL ENCOUNTER (OUTPATIENT)
Dept: RADIOLOGY | Facility: CLINIC | Age: 65
Discharge: HOME | End: 2025-06-24
Attending: PHYSICAL MEDICINE & REHABILITATION | Admitting: PHYSICAL MEDICINE & REHABILITATION
Payer: COMMERCIAL

## 2025-06-24 VITALS
OXYGEN SATURATION: 96 % | HEART RATE: 55 BPM | SYSTOLIC BLOOD PRESSURE: 107 MMHG | DIASTOLIC BLOOD PRESSURE: 69 MMHG | RESPIRATION RATE: 18 BRPM | TEMPERATURE: 98.4 F

## 2025-06-24 DIAGNOSIS — M47.894 THORACIC FACET JOINT SYNDROME: ICD-10-CM

## 2025-06-24 DIAGNOSIS — M54.9 MID BACK PAIN: ICD-10-CM

## 2025-06-24 DIAGNOSIS — M51.24 PROTRUSION OF THORACIC INTERVERTEBRAL DISC: ICD-10-CM

## 2025-06-24 DIAGNOSIS — M79.18 MYOFASCIAL PAIN: ICD-10-CM

## 2025-06-24 PROCEDURE — 64490 INJ PARAVERT F JNT C/T 1 LEV: CPT | Performed by: PHYSICAL MEDICINE & REHABILITATION

## 2025-06-24 PROCEDURE — 64491 INJ PARAVERT F JNT C/T 2 LEV: CPT | Performed by: PHYSICAL MEDICINE & REHABILITATION

## 2025-06-24 RX ORDER — BUPIVACAINE HCL/PF 2.5 MG/ML
3 VIAL (ML) INJECTION ONCE
Status: COMPLETED | OUTPATIENT
Start: 2025-06-24 | End: 2025-06-24

## 2025-06-24 RX ADMIN — BUPIVACAINE HYDROCHLORIDE 3 ML: 2.5 INJECTION, SOLUTION EPIDURAL; INFILTRATION; INTRACAUDAL at 15:01

## 2025-06-24 NOTE — DISCHARGE INSTR - LAB

## 2025-06-24 NOTE — H&P
History of Present Illness: The patient is a 65 y.o. female who presents with complaints of left sided mid back pain    Past Medical History[1]    Past Surgical History[2]    Current Medications[3]    Allergies[4]    Physical Exam:   Vitals:    06/24/25 1423   BP: 110/72   Pulse: 58   Resp: 18   Temp: 98.4 °F (36.9 °C)   SpO2: 98%     General: Awake, Alert, Oriented x 3, Mood and affect appropriate  Respiratory: Respirations even and unlabored  Cardiovascular: Peripheral pulses intact; no edema  Musculoskeletal Exam: Tenderness to palpation left-sided thoracic paraspinals    ASA Score: 2    Patient/Chart Verification  Patient ID Verified: Verbal  ID Band Applied: No  Consents Confirmed: To be obtained in the Procedural area  H&P( within 30 days) Verified: Yes  Interval H&P(within 24 hr) Complete (required for Outpatients and Surgery Admit only): Yes  Allergies Reviewed: Yes  Anticoag/NSAID held?: NA  Currently on antibiotics?: No    Assessment:   1. Mid back pain    2. Myofascial pain    3. Protrusion of thoracic intervertebral disc    4. Thoracic facet joint syndrome        Plan: Left sided T6, T7, T8 MBB #1        [1]   Past Medical History:  Diagnosis Date    AIVR (accelerated idioventricular rhythm) (Formerly [facility])     Allergic     Chronic pain disorder     back    Colon polyp     GERD (gastroesophageal reflux disease)     Irregular heart beat     PVC (premature ventricular contraction)     SVT (supraventricular tachycardia) ([facility]ly [facility])    [2]   Past Surgical History:  Procedure Laterality Date    CARDIAC ELECTROPHYSIOLOGY STUDY AND ABLATION      COLONOSCOPY      UPPER GASTROINTESTINAL ENDOSCOPY     [3]   Current Outpatient Medications:     naproxen (NAPROSYN) 500 mg tablet, Take 1 tablet (500 mg total) by mouth 2 (two) times a day with meals, Disp: 60 tablet, Rfl: 1    Cranberry 1000 MG CAPS, Take 1 tablet by mouth in the morning., Disp: , Rfl:     Cranberry Fruit 465 MG CAPS, Take 1 tablet by mouth in the morning., Disp: ,  Rfl:     flecainide (TAMBOCOR) 50 mg tablet, Take 50 mg by mouth in the morning and 50 mg in the evening., Disp: , Rfl:     lidocaine (Lidoderm) 5 %, Apply 1 patch topically over 12 hours daily Remove & Discard patch within 12 hours or as directed by MD (Patient not taking: Reported on 4/7/2025), Disp: 30 patch, Rfl: 0    Magnesium 100 MG CAPS, Take 100 mg by mouth in the morning, Disp: , Rfl:     metoprolol succinate (TOPROL-XL) 25 mg 24 hr tablet, Take 50 mg by mouth in the morning and 50 mg before bedtime. Taking 100mg daily., Disp: , Rfl:     metoprolol tartrate (LOPRESSOR) 25 mg tablet, Take 25 mg by mouth every 12 (twelve) hours (Patient taking differently: Take 25 mg by mouth as needed), Disp: , Rfl:     pantoprazole (PROTONIX) 40 mg tablet, Take 1 tablet (40 mg total) by mouth daily (Patient not taking: Reported on 10/9/2024), Disp: 30 tablet, Rfl: 1    TiZANidine (ZANAFLEX) 2 MG capsule, Take 1 capsule (2 mg total) by mouth 3 (three) times a day for 5 days (Patient not taking: Reported on 10/15/2024), Disp: 15 capsule, Rfl: 0    Current Facility-Administered Medications:     bupivacaine (PF) (MARCAINE) 0.25 % injection 3 mL, 3 mL, Perineural, Once, Silverio Catherine DO  [4]   Allergies  Allergen Reactions    Penicillins Hives    Shellfish-Derived Products - Food Allergy Swelling

## 2025-06-25 ENCOUNTER — PATIENT MESSAGE (OUTPATIENT)
Dept: PAIN MEDICINE | Facility: CLINIC | Age: 65
End: 2025-06-25

## 2025-06-30 ENCOUNTER — TELEPHONE (OUTPATIENT)
Dept: PAIN MEDICINE | Facility: CLINIC | Age: 65
End: 2025-06-30

## 2025-06-30 NOTE — TELEPHONE ENCOUNTER
Patient has been scheduled for their procedure, please see [employer] message for additional details.

## 2025-07-21 ENCOUNTER — HOSPITAL ENCOUNTER (OUTPATIENT)
Dept: RADIOLOGY | Facility: CLINIC | Age: 65
Discharge: HOME | End: 2025-07-21
Attending: PHYSICAL MEDICINE & REHABILITATION
Payer: COMMERCIAL

## 2025-07-21 VITALS
RESPIRATION RATE: 18 BRPM | DIASTOLIC BLOOD PRESSURE: 73 MMHG | SYSTOLIC BLOOD PRESSURE: 116 MMHG | OXYGEN SATURATION: 94 % | TEMPERATURE: 97.2 F | HEART RATE: 59 BPM

## 2025-07-21 DIAGNOSIS — M47.814 THORACIC SPONDYLOSIS: ICD-10-CM

## 2025-07-21 PROCEDURE — 64491 INJ PARAVERT F JNT C/T 2 LEV: CPT | Performed by: PHYSICAL MEDICINE & REHABILITATION

## 2025-07-21 PROCEDURE — 64490 INJ PARAVERT F JNT C/T 1 LEV: CPT | Performed by: PHYSICAL MEDICINE & REHABILITATION

## 2025-07-21 RX ORDER — BUPIVACAINE HYDROCHLORIDE 7.5 MG/ML
3 INJECTION, SOLUTION EPIDURAL; RETROBULBAR ONCE
Status: COMPLETED | OUTPATIENT
Start: 2025-07-21 | End: 2025-07-21

## 2025-07-21 RX ADMIN — BUPIVACAINE HYDROCHLORIDE 1.5 ML: 7.5 INJECTION, SOLUTION EPIDURAL; RETROBULBAR at 11:13

## 2025-07-21 NOTE — H&P
History of Present Illness: The patient is a 65 y.o. female who presents with complaints of midback pain    Past Medical History[1]    Past Surgical History[2]    Current Medications[3]    Allergies[4]    Physical Exam:   Vitals:    07/21/25 1041   BP: 112/78   Pulse: 57   Resp: 18   Temp: (!) 97.2 °F (36.2 °C)   SpO2: 96%     General: Awake, Alert, Oriented x 3, Mood and affect appropriate  Respiratory: Respirations even and unlabored  Cardiovascular: Peripheral pulses intact; no edema  Musculoskeletal Exam: Tenderness palpation bilateral thoracic paraspinals    ASA Score: 2    Patient/Chart Verification  Patient ID Verified: Verbal  ID Band Applied: No  H&P( within 30 days) Verified: To be obtained in the Procedural area  Allergies Reviewed: Yes  Anticoag/NSAID held?: No  Currently on antibiotics?: No  Does Patient Have a Prosthetic Device/Implant: No (denies cardiac pacer / ICD)    Assessment:   1. Thoracic spondylosis        Plan: Lt T6-T8 MBB#2        [1]   Past Medical History:  Diagnosis Date    AIVR (accelerated idioventricular rhythm) ([facility])     Allergic     Chronic pain disorder     back    Colon polyp     GERD (gastroesophageal reflux disease)     Irregular heart beat     PVC (premature ventricular contraction)     SVT (supraventricular tachycardia) ([facility])    [2]   Past Surgical History:  Procedure Laterality Date    CARDIAC ELECTROPHYSIOLOGY STUDY AND ABLATION      COLONOSCOPY      UPPER GASTROINTESTINAL ENDOSCOPY     [3]   Current Outpatient Medications:     naproxen (NAPROSYN) 500 mg tablet, Take 1 tablet (500 mg total) by mouth 2 (two) times a day with meals, Disp: 60 tablet, Rfl: 1    Cranberry 1000 MG CAPS, Take 1 tablet by mouth in the morning., Disp: , Rfl:     Cranberry Fruit 465 MG CAPS, Take 1 tablet by mouth in the morning., Disp: , Rfl:     flecainide (TAMBOCOR) 50 mg tablet, Take 50 mg by mouth in the morning and 50 mg in the evening., Disp: , Rfl:     lidocaine (Lidoderm) 5 %, Apply 1 patch  topically over 12 hours daily Remove & Discard patch within 12 hours or as directed by MD (Patient not taking: Reported on 4/7/2025), Disp: 30 patch, Rfl: 0    Magnesium 100 MG CAPS, Take 100 mg by mouth in the morning, Disp: , Rfl:     metoprolol succinate (TOPROL-XL) 25 mg 24 hr tablet, Take 50 mg by mouth in the morning and 50 mg before bedtime. Taking 100mg daily., Disp: , Rfl:     metoprolol tartrate (LOPRESSOR) 25 mg tablet, Take 25 mg by mouth every 12 (twelve) hours (Patient taking differently: Take 25 mg by mouth as needed), Disp: , Rfl:     pantoprazole (PROTONIX) 40 mg tablet, Take 1 tablet (40 mg total) by mouth daily (Patient not taking: Reported on 10/9/2024), Disp: 30 tablet, Rfl: 1    TiZANidine (ZANAFLEX) 2 MG capsule, Take 1 capsule (2 mg total) by mouth 3 (three) times a day for 5 days (Patient not taking: Reported on 10/15/2024), Disp: 15 capsule, Rfl: 0    Current Facility-Administered Medications:     bupivacaine (PF) (MARCAINE) 0.75 % injection 3 mL, 3 mL, Other, Once, Silverio Catherine DO  [4]   Allergies  Allergen Reactions    Penicillins Hives    Shellfish-Derived Products - Food Allergy Swelling

## 2025-07-21 NOTE — DISCHARGE INSTR - LAB

## 2025-07-23 ENCOUNTER — TELEPHONE (OUTPATIENT)
Dept: PAIN MEDICINE | Facility: CLINIC | Age: 65
End: 2025-07-23

## 2025-07-23 NOTE — TELEPHONE ENCOUNTER
Patient has been scheduled for their procedure, please see [employer] message for additional details.

## 2025-08-06 ENCOUNTER — TELEPHONE (OUTPATIENT)
Dept: PAIN MEDICINE | Facility: CLINIC | Age: 65
End: 2025-08-06

## 2025-08-06 ENCOUNTER — HOSPITAL ENCOUNTER (OUTPATIENT)
Dept: RADIOLOGY | Facility: HOSPITAL | Age: 65
Discharge: HOME | End: 2025-08-06
Attending: PHYSICAL MEDICINE & REHABILITATION
Payer: COMMERCIAL

## 2025-08-06 VITALS
HEART RATE: 59 BPM | TEMPERATURE: 97.2 F | SYSTOLIC BLOOD PRESSURE: 114 MMHG | DIASTOLIC BLOOD PRESSURE: 56 MMHG | OXYGEN SATURATION: 96 % | RESPIRATION RATE: 18 BRPM

## 2025-08-06 DIAGNOSIS — M47.814 THORACIC SPONDYLOSIS: ICD-10-CM

## 2025-08-06 PROCEDURE — 64634 DESTROY C/TH FACET JNT ADDL: CPT | Performed by: PHYSICAL MEDICINE & REHABILITATION

## 2025-08-06 PROCEDURE — 64633 DESTROY CERV/THOR FACET JNT: CPT | Performed by: PHYSICAL MEDICINE & REHABILITATION

## 2025-08-06 RX ORDER — LIDOCAINE HYDROCHLORIDE 10 MG/ML
10 INJECTION, SOLUTION EPIDURAL; INFILTRATION; INTRACAUDAL; PERINEURAL ONCE
Status: COMPLETED | OUTPATIENT
Start: 2025-08-06 | End: 2025-08-06

## 2025-08-06 RX ORDER — BUPIVACAINE HCL/PF 2.5 MG/ML
5 VIAL (ML) INJECTION ONCE
Status: COMPLETED | OUTPATIENT
Start: 2025-08-06 | End: 2025-08-06

## 2025-08-06 RX ADMIN — LIDOCAINE HYDROCHLORIDE 6 ML: 10 INJECTION, SOLUTION EPIDURAL; INFILTRATION; INTRACAUDAL; PERINEURAL at 08:58

## 2025-08-06 RX ADMIN — BUPIVACAINE HYDROCHLORIDE 3 ML: 2.5 INJECTION, SOLUTION EPIDURAL; INFILTRATION; INTRACAUDAL at 09:09
